# Patient Record
Sex: MALE | Race: WHITE | NOT HISPANIC OR LATINO | Employment: FULL TIME | URBAN - METROPOLITAN AREA
[De-identification: names, ages, dates, MRNs, and addresses within clinical notes are randomized per-mention and may not be internally consistent; named-entity substitution may affect disease eponyms.]

---

## 2021-08-29 ENCOUNTER — OFFICE VISIT (OUTPATIENT)
Dept: URGENT CARE | Facility: CLINIC | Age: 56
End: 2021-08-29
Payer: COMMERCIAL

## 2021-08-29 VITALS
HEART RATE: 84 BPM | RESPIRATION RATE: 16 BRPM | DIASTOLIC BLOOD PRESSURE: 72 MMHG | WEIGHT: 244 LBS | OXYGEN SATURATION: 96 % | TEMPERATURE: 98 F | BODY MASS INDEX: 32.34 KG/M2 | SYSTOLIC BLOOD PRESSURE: 130 MMHG | HEIGHT: 73 IN

## 2021-08-29 DIAGNOSIS — H61.23 BILATERAL HEARING LOSS DUE TO CERUMEN IMPACTION: ICD-10-CM

## 2021-08-29 DIAGNOSIS — H92.02 OTALGIA OF LEFT EAR: ICD-10-CM

## 2021-08-29 DIAGNOSIS — H60.502 ACUTE NONINFECTIVE OTITIS EXTERNA OF LEFT EAR, UNSPECIFIED TYPE: ICD-10-CM

## 2021-08-29 PROCEDURE — 99203 OFFICE O/P NEW LOW 30 MIN: CPT | Performed by: PHYSICIAN ASSISTANT

## 2021-08-29 RX ORDER — CIPROFLOXACIN AND DEXAMETHASONE 3; 1 MG/ML; MG/ML
4 SUSPENSION/ DROPS AURICULAR (OTIC) 2 TIMES DAILY
Qty: 2.8 ML | Refills: 0 | Status: SHIPPED | OUTPATIENT
Start: 2021-08-29 | End: 2021-09-05

## 2021-08-29 ASSESSMENT — ENCOUNTER SYMPTOMS
CHILLS: 0
ABDOMINAL PAIN: 0
COUGH: 0
CONSTIPATION: 0
MYALGIAS: 0
NAUSEA: 0
HEADACHES: 0
EYE PAIN: 0
VOMITING: 0
DIARRHEA: 0
SORE THROAT: 0
FEVER: 0
SHORTNESS OF BREATH: 0

## 2021-08-29 NOTE — PROGRESS NOTES
"Subjective:   Cory Yancey is a 55 y.o. male who presents for Cerumen Impaction (5X WEEKS, CAN NOT HEAR OUT OF RIGHT EAR)      HPI:  55-year-old male presents with bilateral decreased hearing and suspected cerumen impaction.  Had similar issue 10 years ago that required lavage.  No tinnitus or otalgia    Review of Systems   Constitutional: Negative for chills and fever.   HENT: Positive for hearing loss. Negative for congestion, ear discharge, ear pain, sore throat and tinnitus.    Eyes: Negative for pain.   Respiratory: Negative for cough and shortness of breath.    Cardiovascular: Negative for chest pain.   Gastrointestinal: Negative for abdominal pain, constipation, diarrhea, nausea and vomiting.   Genitourinary: Negative for dysuria.   Musculoskeletal: Negative for myalgias.   Skin: Negative for rash.   Neurological: Negative for headaches.       Medications:    • ciprofloxacin/dexamethasone Susp  • oxyCODONE-acetaminophen Tabs    Allergies: Patient has no known allergies.    Problem List: Cory Yancey does not have a problem list on file.    Surgical History:  No past surgical history on file.    Past Social Hx: Cory Yancey       Past Family Hx:  Cory Yancey family history is not on file.     Problem list, medications, and allergies reviewed by myself today in Epic.     Objective:     /72 (BP Location: Left arm, Patient Position: Sitting, BP Cuff Size: Large adult)   Pulse 84   Temp 36.7 °C (98 °F) (Temporal)   Resp 16   Ht 1.854 m (6' 1\")   Wt 111 kg (244 lb)   SpO2 96%   BMI 32.19 kg/m²     Physical Exam  Vitals reviewed.   Constitutional:       Appearance: Normal appearance.   HENT:      Head: Normocephalic and atraumatic.      Right Ear: External ear normal. There is impacted cerumen.      Left Ear: External ear normal. There is impacted cerumen.      Nose: Nose normal.      Mouth/Throat:      Mouth: Mucous membranes are moist.   Eyes:      " Conjunctiva/sclera: Conjunctivae normal.   Cardiovascular:      Rate and Rhythm: Normal rate.   Pulmonary:      Effort: Pulmonary effort is normal.   Skin:     General: Skin is warm and dry.      Capillary Refill: Capillary refill takes less than 2 seconds.   Neurological:      Mental Status: He is alert and oriented to person, place, and time.         Assessment/Plan:     Diagnosis and associated orders:     1. Bilateral hearing loss due to cerumen impaction     2. Otalgia of left ear     3. Acute noninfective otitis externa of left ear, unspecified type  ciprofloxacin/dexamethasone (CIPRODEX) 0.3-0.1 % Suspension      Comments/MDM:     • Peroxide drops were used to soften wax followed by irrigation, the medical assistant attempted to curettage out a large chunk from the left ear however patient noted increase in his pain.  I reevaluated the patient and the canal was found to be erythematous and mildly edematous without any bleeding or perforation visualized.  Will cover with Ciprodex drops, recommend irrigation at home versus returning versus follow-up with primary to complete lavage.  Patient may be able to remedy this at home as well.         Differential diagnosis, natural history, supportive care, and indications for immediate follow-up discussed.    Advised the patient to follow-up with the primary care physician for recheck, reevaluation, and consideration of further management.    Please note that this dictation was created using voice recognition software. I have made a reasonable attempt to correct obvious errors, but I expect that there are errors of grammar and possibly content that I did not discover before finalizing the note.    This note was electronically signed by Gage Montgomery PA-C

## 2022-05-06 ENCOUNTER — OFFICE VISIT (OUTPATIENT)
Dept: URGENT CARE | Facility: CLINIC | Age: 57
End: 2022-05-06
Payer: COMMERCIAL

## 2022-05-06 ENCOUNTER — HOSPITAL ENCOUNTER (OUTPATIENT)
Dept: RADIOLOGY | Facility: MEDICAL CENTER | Age: 57
End: 2022-05-06
Attending: PHYSICIAN ASSISTANT
Payer: COMMERCIAL

## 2022-05-06 VITALS
TEMPERATURE: 97 F | OXYGEN SATURATION: 95 % | WEIGHT: 247 LBS | SYSTOLIC BLOOD PRESSURE: 142 MMHG | HEIGHT: 73 IN | DIASTOLIC BLOOD PRESSURE: 78 MMHG | BODY MASS INDEX: 32.74 KG/M2 | HEART RATE: 80 BPM | RESPIRATION RATE: 17 BRPM

## 2022-05-06 DIAGNOSIS — T14.8XXA INFECTED WOUND: ICD-10-CM

## 2022-05-06 DIAGNOSIS — L08.9 INFECTED WOUND: ICD-10-CM

## 2022-05-06 DIAGNOSIS — M79.605 ACUTE PAIN OF LEFT LOWER EXTREMITY: ICD-10-CM

## 2022-05-06 PROCEDURE — 93971 EXTREMITY STUDY: CPT | Mod: LT

## 2022-05-06 PROCEDURE — 99214 OFFICE O/P EST MOD 30 MIN: CPT | Performed by: PHYSICIAN ASSISTANT

## 2022-05-06 RX ORDER — TESTOSTERONE CYPIONATE 200 MG/ML
40 INJECTION, SOLUTION INTRAMUSCULAR DAILY
COMMUNITY

## 2022-05-06 RX ORDER — SULFAMETHOXAZOLE AND TRIMETHOPRIM 800; 160 MG/1; MG/1
1 TABLET ORAL 2 TIMES DAILY
Qty: 20 TABLET | Refills: 0 | Status: SHIPPED | OUTPATIENT
Start: 2022-05-06 | End: 2022-05-16

## 2022-05-06 ASSESSMENT — ENCOUNTER SYMPTOMS
LEG PAIN: 1
PALPITATIONS: 0
FEVER: 0
CHILLS: 0
SHORTNESS OF BREATH: 0

## 2022-05-06 NOTE — PROGRESS NOTES
Subjective:   Cory Yancey is a 56 y.o. male who presents today with   Chief Complaint   Patient presents with   • Leg Problem     Nodules , possible blood clots within inner leg leg  x 5 months        Leg Pain  This is a new problem. The current episode started more than 1 month ago. The problem occurs constantly. The problem has been unchanged. Pertinent negatives include no chest pain, chills or fever. He has tried nothing for the symptoms. The treatment provided no relief.     Patient states he was initially seen a few months ago for an infection in his left inner thigh.  He was concerned for blood clot at that time but they did not do any imaging.  Patient is requesting imaging at this time.  He did have antibiotics at that time but did not have any imaging done.  He states that he does not do any testosterone injections to the thigh.  Patient states initially there was a piece of skin that he peeled off prior to being seen for infection the last time.  Denies any recent injury or trauma to the area.  He states it was clearing out but then over the last month or so it started draining again.  PMH:  has no past medical history on file.  MEDS:   Current Outpatient Medications:   •  testosterone cypionate (DEPO-TESTOSTERONE) 200 MG/ML Solution injection, Inject 50 mg into the shoulder, thigh, or buttocks one time., Disp: , Rfl:   •  sulfamethoxazole-trimethoprim (BACTRIM DS) 800-160 MG tablet, Take 1 Tablet by mouth 2 times a day for 10 days., Disp: 20 Tablet, Rfl: 0  •  oxycodone-acetaminophen (PERCOCET) 5-325 MG TABS, Take 1-2 Tabs by mouth every four hours as needed. (Patient not taking: No sig reported), Disp: 20 Tab, Rfl: 1  ALLERGIES: No Known Allergies  SURGHX: No past surgical history on file.  SOCHX:  reports that he has never smoked. He has never used smokeless tobacco.  FH: Reviewed with patient, not pertinent to this visit.       Review of Systems   Constitutional: Negative for chills and  "fever.   Respiratory: Negative for shortness of breath.    Cardiovascular: Negative for chest pain and palpitations.   Skin:        Infected wound to left inner thigh.        Objective:   /78 (BP Location: Left arm, Patient Position: Sitting, BP Cuff Size: Adult)   Pulse 80   Temp 36.1 °C (97 °F) (Temporal)   Resp 17   Ht 1.854 m (6' 1\")   Wt 112 kg (247 lb)   SpO2 95%   BMI 32.59 kg/m²   Physical Exam  Vitals and nursing note reviewed.   Constitutional:       General: He is not in acute distress.     Appearance: Normal appearance. He is well-developed. He is not ill-appearing or toxic-appearing.   HENT:      Head: Normocephalic and atraumatic.      Right Ear: Hearing normal.      Left Ear: Hearing normal.   Eyes:      Pupils: Pupils are equal, round, and reactive to light.   Cardiovascular:      Rate and Rhythm: Normal rate and regular rhythm.      Heart sounds: Normal heart sounds.   Pulmonary:      Effort: Pulmonary effort is normal.   Musculoskeletal:      Right lower leg: No edema.      Left lower leg: No edema.      Comments: Normal movement in all 4 extremities   Skin:     General: Skin is warm and dry.             Comments: Open wound site to left inner thigh with mild surrounding erythema active drainage of pus.  No streaking proximally or distally.  Does appear to be palpable cordlike area consistent with varicose veins or thrombosis to the area proximally and distally.   Neurological:      Mental Status: He is alert.      Coordination: Coordination normal.   Psychiatric:         Mood and Affect: Mood normal.       US  FINDINGS:     REAL-TIME GRAY-SCALE IMAGING:  Real-time gray-scale imaging reveals no evidence of focal wall thickening.     In the area of pain is elongated hypoechoic structure in the subcutaneous tissues in the inner proximal thigh without significant vascularity. This probably represents a fluid collection possibly evolving hematoma. Consider follow-up.     COLOR AND DUPLEX " DOPPLER IMAGING:  There is no evidence of luminal thrombus.  There is normal augmentation to flow and respiratory variation.     IMPRESSION:     No evidence of left lower extremity deep venous thrombosis.     Elongated hypoechoic subcutaneous structure without internal vascularity could be a fluid collection/hematoma. Suggest follow-up.    Assessment/Plan:   Assessment    1. Acute pain of left lower extremity  - US-EXTREMITY VENOUS LOWER UNILAT LEFT; Future    2. Infected wound  - sulfamethoxazole-trimethoprim (BACTRIM DS) 800-160 MG tablet; Take 1 Tablet by mouth 2 times a day for 10 days.  Dispense: 20 Tablet; Refill: 0  - Referral to General Surgery    Other orders  - testosterone cypionate (DEPO-TESTOSTERONE) 200 MG/ML Solution injection; Inject 50 mg into the shoulder, thigh, or buttocks one time.    Concern for possible tunneling from the open wound.  No indication for incision and drainage as it is actively draining.  Differential as discussed with patient would include superficial thrombosis versus varicose veins to the area.  Findings on ultrasound are consistent with possible tunneling of abscess versus hematoma collection.  Would recommend follow-up with general surgeon as referral was placed today.  Area was cleaned and new bandage was placed today.  Wound care instructions discussed with patient.  Differential diagnosis, natural history, supportive care, and indications for immediate follow-up discussed.   Patient given instructions and understanding of medications and treatment.    If not improving in 3-5 days, F/U with PCP or return to  if symptoms worsen.    Patient agreeable to plan.  Greater than 30 minutes were spent reviewing patient's chart, examining and obtaining history from patient, and discussing plan of care.     Please note that this dictation was created using voice recognition software. I have made every reasonable attempt to correct obvious errors, but I expect that there are errors  of grammar and possibly content that I did not discover before finalizing the note.    Reilly Martinez PA-C

## 2022-06-30 ENCOUNTER — PRE-ADMISSION TESTING (OUTPATIENT)
Dept: ADMISSIONS | Facility: MEDICAL CENTER | Age: 57
End: 2022-06-30
Attending: SURGERY
Payer: COMMERCIAL

## 2022-06-30 RX ORDER — M-VIT,TX,IRON,MINS/CALC/FOLIC 27MG-0.4MG
1 TABLET ORAL DAILY
Status: ON HOLD | COMMUNITY
End: 2022-09-14

## 2022-07-10 ENCOUNTER — APPOINTMENT (OUTPATIENT)
Dept: RADIOLOGY | Facility: MEDICAL CENTER | Age: 57
End: 2022-07-10
Attending: EMERGENCY MEDICINE
Payer: COMMERCIAL

## 2022-07-10 ENCOUNTER — HOSPITAL ENCOUNTER (EMERGENCY)
Facility: MEDICAL CENTER | Age: 57
End: 2022-07-10
Attending: EMERGENCY MEDICINE
Payer: COMMERCIAL

## 2022-07-10 VITALS
DIASTOLIC BLOOD PRESSURE: 77 MMHG | HEIGHT: 73 IN | SYSTOLIC BLOOD PRESSURE: 128 MMHG | OXYGEN SATURATION: 96 % | RESPIRATION RATE: 18 BRPM | WEIGHT: 245.81 LBS | TEMPERATURE: 97.9 F | BODY MASS INDEX: 32.58 KG/M2 | HEART RATE: 64 BPM

## 2022-07-10 DIAGNOSIS — L02.416 ABSCESS OF LEFT THIGH: ICD-10-CM

## 2022-07-10 PROBLEM — L73.2 HIDRADENITIS: Status: ACTIVE | Noted: 2022-07-10

## 2022-07-10 LAB
ALBUMIN SERPL BCP-MCNC: 4 G/DL (ref 3.2–4.9)
ALBUMIN/GLOB SERPL: 1.3 G/DL
ALP SERPL-CCNC: 91 U/L (ref 30–99)
ALT SERPL-CCNC: 20 U/L (ref 2–50)
ANION GAP SERPL CALC-SCNC: 10 MMOL/L (ref 7–16)
AST SERPL-CCNC: 20 U/L (ref 12–45)
BASOPHILS # BLD AUTO: 0.4 % (ref 0–1.8)
BASOPHILS # BLD: 0.04 K/UL (ref 0–0.12)
BILIRUB SERPL-MCNC: 0.3 MG/DL (ref 0.1–1.5)
BUN SERPL-MCNC: 19 MG/DL (ref 8–22)
CALCIUM SERPL-MCNC: 9.3 MG/DL (ref 8.5–10.5)
CHLORIDE SERPL-SCNC: 104 MMOL/L (ref 96–112)
CO2 SERPL-SCNC: 25 MMOL/L (ref 20–33)
CREAT SERPL-MCNC: 0.98 MG/DL (ref 0.5–1.4)
EOSINOPHIL # BLD AUTO: 0.05 K/UL (ref 0–0.51)
EOSINOPHIL NFR BLD: 0.5 % (ref 0–6.9)
ERYTHROCYTE [DISTWIDTH] IN BLOOD BY AUTOMATED COUNT: 47.7 FL (ref 35.9–50)
GFR SERPLBLD CREATININE-BSD FMLA CKD-EPI: 89 ML/MIN/1.73 M 2
GLOBULIN SER CALC-MCNC: 3.1 G/DL (ref 1.9–3.5)
GLUCOSE SERPL-MCNC: 87 MG/DL (ref 65–99)
HCT VFR BLD AUTO: 49.1 % (ref 42–52)
HGB BLD-MCNC: 16.4 G/DL (ref 14–18)
IMM GRANULOCYTES # BLD AUTO: 0.06 K/UL (ref 0–0.11)
IMM GRANULOCYTES NFR BLD AUTO: 0.5 % (ref 0–0.9)
LACTATE SERPL-SCNC: 1 MMOL/L (ref 0.5–2)
LYMPHOCYTES # BLD AUTO: 1.32 K/UL (ref 1–4.8)
LYMPHOCYTES NFR BLD: 12 % (ref 22–41)
MCH RBC QN AUTO: 32 PG (ref 27–33)
MCHC RBC AUTO-ENTMCNC: 33.4 G/DL (ref 33.7–35.3)
MCV RBC AUTO: 95.9 FL (ref 81.4–97.8)
MONOCYTES # BLD AUTO: 0.82 K/UL (ref 0–0.85)
MONOCYTES NFR BLD AUTO: 7.4 % (ref 0–13.4)
NEUTROPHILS # BLD AUTO: 8.74 K/UL (ref 1.82–7.42)
NEUTROPHILS NFR BLD: 79.2 % (ref 44–72)
NRBC # BLD AUTO: 0 K/UL
NRBC BLD-RTO: 0 /100 WBC
PLATELET # BLD AUTO: 333 K/UL (ref 164–446)
PMV BLD AUTO: 8.3 FL (ref 9–12.9)
POTASSIUM SERPL-SCNC: 4.4 MMOL/L (ref 3.6–5.5)
PROT SERPL-MCNC: 7.1 G/DL (ref 6–8.2)
RBC # BLD AUTO: 5.12 M/UL (ref 4.7–6.1)
SODIUM SERPL-SCNC: 139 MMOL/L (ref 135–145)
WBC # BLD AUTO: 11 K/UL (ref 4.8–10.8)

## 2022-07-10 PROCEDURE — 80053 COMPREHEN METABOLIC PANEL: CPT

## 2022-07-10 PROCEDURE — 36415 COLL VENOUS BLD VENIPUNCTURE: CPT

## 2022-07-10 PROCEDURE — 85025 COMPLETE CBC W/AUTO DIFF WBC: CPT

## 2022-07-10 PROCEDURE — 700102 HCHG RX REV CODE 250 W/ 637 OVERRIDE(OP): Performed by: EMERGENCY MEDICINE

## 2022-07-10 PROCEDURE — 700117 HCHG RX CONTRAST REV CODE 255: Performed by: EMERGENCY MEDICINE

## 2022-07-10 PROCEDURE — 83605 ASSAY OF LACTIC ACID: CPT

## 2022-07-10 PROCEDURE — 73701 CT LOWER EXTREMITY W/DYE: CPT | Mod: LT

## 2022-07-10 PROCEDURE — 99284 EMERGENCY DEPT VISIT MOD MDM: CPT

## 2022-07-10 PROCEDURE — A9270 NON-COVERED ITEM OR SERVICE: HCPCS | Performed by: EMERGENCY MEDICINE

## 2022-07-10 PROCEDURE — 99283 EMERGENCY DEPT VISIT LOW MDM: CPT | Performed by: SURGERY

## 2022-07-10 RX ORDER — CLINDAMYCIN HYDROCHLORIDE 150 MG/1
300 CAPSULE ORAL ONCE
Status: COMPLETED | OUTPATIENT
Start: 2022-07-10 | End: 2022-07-10

## 2022-07-10 RX ORDER — CLINDAMYCIN HYDROCHLORIDE 300 MG/1
300 CAPSULE ORAL 4 TIMES DAILY
Qty: 20 CAPSULE | Refills: 0 | Status: SHIPPED | OUTPATIENT
Start: 2022-07-10 | End: 2022-07-15

## 2022-07-10 RX ORDER — HYDROCODONE BITARTRATE AND ACETAMINOPHEN 5; 325 MG/1; MG/1
1 TABLET ORAL EVERY 4 HOURS PRN
Qty: 15 TABLET | Refills: 0 | Status: ON HOLD | OUTPATIENT
Start: 2022-07-10 | End: 2022-07-12

## 2022-07-10 RX ORDER — HYDROCODONE BITARTRATE AND ACETAMINOPHEN 5; 325 MG/1; MG/1
1 TABLET ORAL ONCE
Status: COMPLETED | OUTPATIENT
Start: 2022-07-10 | End: 2022-07-10

## 2022-07-10 RX ADMIN — CLINDAMYCIN HYDROCHLORIDE 300 MG: 150 CAPSULE ORAL at 17:29

## 2022-07-10 RX ADMIN — HYDROCODONE BITARTRATE AND ACETAMINOPHEN 1 TABLET: 5; 325 TABLET ORAL at 17:30

## 2022-07-10 RX ADMIN — IOHEXOL 75 ML: 350 INJECTION, SOLUTION INTRAVENOUS at 15:24

## 2022-07-10 NOTE — ED TRIAGE NOTES
Amb to triage w/ c/o pain/swelling to L inner thigh going into groin.  Hx of abscess/chronic infection to area, scheduled for surgery 7/12.  Pt states symptoms have worsened and unable to tolerate for the next 2 days.

## 2022-07-10 NOTE — ED PROVIDER NOTES
ED Provider Note    ED Provider Note    Primary care provider: Pcp Unknown  Means of arrival: Walk-in  History obtained from: Patient    CHIEF COMPLAINT  Chief Complaint   Patient presents with   • Abscess   • Groin Pain     Seen at 1:03 PM.   HPI  Cory Yancey is a 56 y.o. male  who presents to the Emergency Department left eye pain, redness, swelling and drainage.  The patient has had difficulty in this region since December of last year.  He is went to the urgent care twice already, had ultrasounds and placed on antibiotics.  He was eventually referred to surgery, he saw Dr. Padilla, he is planned for surgical resection of an abscess in 48 hours.  The patient has noted the pain, swelling, redness has steadily increased, significantly so over the past 48 hours, such that the patient is having difficulty walking at this time.    He is unaware of any fevers or chills.  He denies any chest pain, shortness of breath.  No history of diabetes.    REVIEW OF SYSTEMS  See HPI,   Remainder of ROS negative.     PAST MEDICAL HISTORY   has a past medical history of Arthritis (06/30/2022) and PONV (postoperative nausea and vomiting).    SURGICAL HISTORY   has a past surgical history that includes knee arthroscopy (Right, 1989) and arthroscopy, knee (Left).    SOCIAL HISTORY  Social History     Tobacco Use   • Smoking status: Never Smoker   • Smokeless tobacco: Former User     Types: Chew     Quit date: 2017   • Tobacco comment: transitioned to nicotine patch daily in 2017   Vaping Use   • Vaping Use: Never used   Substance Use Topics   • Alcohol use: Not Currently     Comment: Quit 2019   • Drug use: Never      Social History     Substance and Sexual Activity   Drug Use Never       FAMILY HISTORY  History reviewed. No pertinent family history.    CURRENT MEDICATIONS  Reviewed.  See Encounter Summary.     ALLERGIES  No Known Allergies    PHYSICAL EXAM  VITAL SIGNS: /77   Pulse 64   Temp 36.6 °C (97.9  "°F) (Temporal)   Resp 18   Ht 1.854 m (6' 1\")   Wt 112 kg (245 lb 13 oz)   SpO2 96%   BMI 32.43 kg/m²   Constitutional: Awake, alert in no apparent distress.  HENT: Normocephalic, Bilateral external ears normal. Nose normal.   Eyes: Conjunctiva normal, non-icteric, EOMI.    Thorax & Lungs: Easy unlabored respirations, Clear to ascultation bilaterally.  Cardiovascular: Regular rate, Regular rhythm, No murmurs, rubs or gallops. Bilateral pulses symmetrical.   Abdomen:  Soft, nontender, nondistended, normal active bowel sounds.   :    Skin: Left medial thigh: 5 x 5 cm area of dark violaceous skin with purulence and necrotic blood expressed from 2 separate spontaneous draining openings.  This entire region is tender to palpation, there is a faint halo of redness extending 15 cm outside of the initial area, there is tenderness and fullness noted to the posterior aspect of the upper leg as well.  This area does not extend up into the inguinal crease or down to the knee.    Musculoskeletal:   No cyanosis, clubbing or edema. No leg asymmetry.   Neurologic: Alert, Grossly non-focal.   Psychiatric: Normal affect, Normal mood  Lymphatic:  No cervical LAD      RADIOLOGY  CT-EXTREMITY, LOWER WITH LEFT   Final Result      1.  Extensive skin thickening and subcutaneous tissues inflammation at the medial and posterior LEFT upper thigh with inflammatory phlegmon versus developing abscess posterior medially at the upper thigh.  No definite discrete drainable abscess or soft    tissue gas.   2.  No deep muscular inflammatory changes.            COURSE & MEDICAL DECISION MAKING  Pertinent Labs & Imaging studies reviewed. (See chart for details)    Differential diagnoses include but are not limited to: Appears to be a very large abscess, concerning for sepsis, less likely necrotizing fasciitis    1:03 PM - Medical record reviewed, no recent ER visits, patient is scheduled for hydradenitis removal by Dr. Padilla.    2:30 PM Case " discussed with Dr. Adan who will evaluate the patient.    4 PM Dr. Adan evaluated the patient, he ran the case past Dr. Padilla, they recommend starting clindamycin, have the patient continue to wait it out and go for surgery in 48 hours as scheduled.       decision Making:  This is a pleasant 56 y.o. year old male who presents with months of left thigh pain, steadily worsening with swelling over the past 72 hours.  The patient does have a fairly impressive area of induration, leaking of necrotic and purulent material.  I am not concerned about necrotizing fasciitis at this time, he is not diabetic, he has had symptoms for many months.  He does not have pain out of proportion to examination.  CT is not as impressive as physical examination today.  I discussed the case with surgery as above, the patient was evaluated in the emergency department.  They would prefer to continue the current plan.  The patient is willing to try an outpatient round of antibiotics prior to surgical correction in 48 hours.  I will place him on a short course of pain medications as well.    In prescribing controlled substances to this patient, I certify that I have obtained and reviewed the medical history of Cory Yancey. I have also made a good connie effort to obtain applicable records from other providers who have treated the patient and records did not demonstrate any increased risk of substance abuse that would prevent me from prescribing controlled substances.     I have conducted a physical exam and documented it. I have reviewed Mr. Yancey’s prescription history as maintained by the [place] Prescription Monitoring Program.     I have assessed the patient’s risk for abuse, dependency, and addiction using the validated Opioid Risk Tool available at https://www.mdcalc.com/gtjdni-oswt-kygk-ort-narcotic-abuse.     Given the above, I believe the benefits of controlled substance therapy outweigh the risks. The reasons for prescribing  controlled substances include non-narcotic, oral analgesic alternatives have been inadequate for pain control. Accordingly, I have discussed the risk and benefits, treatment plan, and alternative therapies with the patient.         Discharge Medications:  Discharge Medication List as of 7/10/2022  5:35 PM      START taking these medications    Details   HYDROcodone-acetaminophen (NORCO) 5-325 MG Tab per tablet Take 1 Tablet by mouth every four hours as needed (pain) for up to 3 days., Disp-15 Tablet, R-0, Normal      clindamycin (CLEOCIN) 300 MG Cap Take 1 Capsule by mouth 4 times a day for 5 days., Disp-20 Capsule, R-0, Normal             The patient was discharged home (see d/c instructions) was told to return immediately for any signs or symptoms listed, or any worsening at all.  The patient verbally agreed to the discharge precautions and follow-up plan which is documented in EPIC.        FINAL IMPRESSION  1. Abscess of left thigh

## 2022-07-10 NOTE — CONSULTS
DATE OF CONSULTATION:  7/10/2022     REFERRING PHYSICIAN:   Rayshawn Garcia M.D.     CONSULTING PHYSICIAN:  Dominguez Adan M.D.     REASON FOR CONSULTATION:  I have been asked by  to see the patient in surgical consultation for evaluation of left posterior thigh lesion.    HISTORY OF PRESENT ILLNESS: The patient is a 56 year-old White man who presents to the Emergency Department with a chronic posterior left thigh lesion that has become more painful and indurated over the last two-days. The lesions has been present since last December and episodically becomes more painful, swollen, and drains purulent or bloody fluid. He is scheduled to undergo resection of the lesion this Tuesday. However over the weekend the lesion has become more painful causing him discomfort. He notes an area farther caudad has become indurated making it painful to sit. He denies fevers, chills, spreading erythema, nausea, or vomiting.      PAST MEDICAL HISTORY:  has a past medical history of Arthritis (06/30/2022) and PONV (postoperative nausea and vomiting).    PAST SURGICAL HISTORY:  has a past surgical history that includes knee arthroscopy (Right, 1989) and arthroscopy, knee (Left).    ALLERGIES: No Known Allergies    CURRENT MEDICATIONS:    Home Medications    **Home medications have not yet been reviewed for this encounter**         FAMILY HISTORY: family history is not on file.    SOCIAL HISTORY:  reports that he has never smoked. He quit smokeless tobacco use about 5 years ago.  His smokeless tobacco use included chew. He reports previous alcohol use. He reports that he does not use drugs.    REVIEW OF SYSTEMS: Comprehensive review of systems is negative with the exception of the aforementioned HPI, PMH, and PSH bullets in accordance with CMS guidelines.    PHYSICAL EXAMINATION:    Physical Exam  Constitutional:       General: He is not in acute distress.     Appearance: Normal appearance.   HENT:      Head: Normocephalic and  atraumatic.      Right Ear: External ear normal.      Left Ear: External ear normal.      Nose: Nose normal.      Mouth/Throat:      Mouth: Mucous membranes are moist.      Pharynx: Oropharynx is clear.   Eyes:      General: No scleral icterus.     Extraocular Movements: Extraocular movements intact.      Pupils: Pupils are equal, round, and reactive to light.   Cardiovascular:      Rate and Rhythm: Normal rate and regular rhythm.      Pulses: Normal pulses.   Abdominal:      General: There is no distension.      Palpations: Abdomen is soft.      Tenderness: There is no abdominal tenderness. There is no guarding or rebound.   Musculoskeletal:         General: No tenderness or deformity. Normal range of motion.      Cervical back: Neck supple.   Skin:     General: Skin is warm and dry.      Capillary Refill: Capillary refill takes less than 2 seconds.      Coloration: Skin is not jaundiced.      Comments: Left posterior thigh with area of hyperpigmentation and induration, two sinus tracts draining serosanguinous fluid. See image below.   Neurological:      General: No focal deficit present.      Mental Status: He is alert and oriented to person, place, and time.   Psychiatric:         Mood and Affect: Mood normal.         Behavior: Behavior normal.         Thought Content: Thought content normal.         Judgment: Judgment normal.     Left posterior thigh lesion:          LABORATORY VALUES:   Recent Labs     07/10/22  1325   WBC 11.0*   RBC 5.12   HEMOGLOBIN 16.4   HEMATOCRIT 49.1   MCV 95.9   MCH 32.0   MCHC 33.4*   RDW 47.7   PLATELETCT 333   MPV 8.3*     Recent Labs     07/10/22  1325   SODIUM 139   POTASSIUM 4.4   CHLORIDE 104   CO2 25   GLUCOSE 87   BUN 19   CREATININE 0.98   CALCIUM 9.3     Recent Labs     07/10/22  1325   ASTSGOT 20   ALTSGPT 20   TBILIRUBIN 0.3   ALKPHOSPHAT 91   GLOBULIN 3.1            IMAGING:   CT-EXTREMITY, LOWER WITH LEFT   Final Result      1.  Extensive skin thickening and subcutaneous  tissues inflammation at the medial and posterior LEFT upper thigh with inflammatory phlegmon versus developing abscess posterior medially at the upper thigh.  No definite discrete drainable abscess or soft    tissue gas.   2.  No deep muscular inflammatory changes.          ASSESSMENT AND PLAN:     * Hidradenitis- (present on admission)  Assessment & Plan  Chronic left posterior thigh lesion consistent with hidradenitis with worsening swelling, drainage, and pain.  Scheduled for excision on 7/12 with Dr Padilla.  Mild leukocytosis on labs, no drainable collection on CT scan.  Plan for discharge on oral Clindamycin and analgesics with follow-up for excision on Tuesday.         ____________________________________     Dominguez Adan M.D.    DD: 7/10/2022  4:25 PM    ACS NSQIP Surgical Risk Calculator

## 2022-07-11 NOTE — ASSESSMENT & PLAN NOTE
Chronic left posterior thigh lesion consistent with hidradenitis with worsening swelling, drainage, and pain.  Scheduled for excision on 7/12 with Dr Padilla.  Mild leukocytosis on labs, no drainable collection on CT scan.  Plan for discharge on oral Clindamycin and analgesics with follow-up for excision on Tuesday.

## 2022-07-12 ENCOUNTER — HOSPITAL ENCOUNTER (OUTPATIENT)
Facility: MEDICAL CENTER | Age: 57
End: 2022-07-12
Attending: SURGERY | Admitting: SURGERY
Payer: COMMERCIAL

## 2022-07-12 ENCOUNTER — ANESTHESIA EVENT (OUTPATIENT)
Dept: SURGERY | Facility: MEDICAL CENTER | Age: 57
End: 2022-07-12
Payer: COMMERCIAL

## 2022-07-12 ENCOUNTER — ANESTHESIA (OUTPATIENT)
Dept: SURGERY | Facility: MEDICAL CENTER | Age: 57
End: 2022-07-12
Payer: COMMERCIAL

## 2022-07-12 VITALS
HEART RATE: 73 BPM | HEIGHT: 73 IN | RESPIRATION RATE: 18 BRPM | WEIGHT: 244.71 LBS | OXYGEN SATURATION: 94 % | SYSTOLIC BLOOD PRESSURE: 144 MMHG | BODY MASS INDEX: 32.43 KG/M2 | TEMPERATURE: 98.6 F | DIASTOLIC BLOOD PRESSURE: 78 MMHG

## 2022-07-12 DIAGNOSIS — G89.18 POST-OPERATIVE PAIN: ICD-10-CM

## 2022-07-12 LAB
FUNGUS SPEC FUNGUS STN: NORMAL
GRAM STN SPEC: NORMAL
PATHOLOGY CONSULT NOTE: NORMAL
SIGNIFICANT IND 70042: NORMAL
SIGNIFICANT IND 70042: NORMAL
SITE SITE: NORMAL
SITE SITE: NORMAL
SOURCE SOURCE: NORMAL
SOURCE SOURCE: NORMAL

## 2022-07-12 PROCEDURE — 700101 HCHG RX REV CODE 250: Performed by: ANESTHESIOLOGY

## 2022-07-12 PROCEDURE — 160025 RECOVERY II MINUTES (STATS): Performed by: SURGERY

## 2022-07-12 PROCEDURE — 87070 CULTURE OTHR SPECIMN AEROBIC: CPT

## 2022-07-12 PROCEDURE — 160002 HCHG RECOVERY MINUTES (STAT): Performed by: SURGERY

## 2022-07-12 PROCEDURE — 160046 HCHG PACU - 1ST 60 MINS PHASE II: Performed by: SURGERY

## 2022-07-12 PROCEDURE — 160028 HCHG SURGERY MINUTES - 1ST 30 MINS LEVEL 3: Performed by: SURGERY

## 2022-07-12 PROCEDURE — 700111 HCHG RX REV CODE 636 W/ 250 OVERRIDE (IP): Performed by: ANESTHESIOLOGY

## 2022-07-12 PROCEDURE — 700105 HCHG RX REV CODE 258: Performed by: SURGERY

## 2022-07-12 PROCEDURE — 87185 SC STD ENZYME DETCJ PER NZM: CPT

## 2022-07-12 PROCEDURE — 160039 HCHG SURGERY MINUTES - EA ADDL 1 MIN LEVEL 3: Performed by: SURGERY

## 2022-07-12 PROCEDURE — 00400 ANES INTEGUMENTARY SYS NOS: CPT | Performed by: ANESTHESIOLOGY

## 2022-07-12 PROCEDURE — 160009 HCHG ANES TIME/MIN: Performed by: SURGERY

## 2022-07-12 PROCEDURE — 160047 HCHG PACU  - EA ADDL 30 MINS PHASE II: Performed by: SURGERY

## 2022-07-12 PROCEDURE — 87102 FUNGUS ISOLATION CULTURE: CPT

## 2022-07-12 PROCEDURE — 87075 CULTR BACTERIA EXCEPT BLOOD: CPT

## 2022-07-12 PROCEDURE — 160048 HCHG OR STATISTICAL LEVEL 1-5: Performed by: SURGERY

## 2022-07-12 PROCEDURE — 88304 TISSUE EXAM BY PATHOLOGIST: CPT

## 2022-07-12 PROCEDURE — 160035 HCHG PACU - 1ST 60 MINS PHASE I: Performed by: SURGERY

## 2022-07-12 PROCEDURE — 87205 SMEAR GRAM STAIN: CPT

## 2022-07-12 PROCEDURE — 700101 HCHG RX REV CODE 250: Performed by: SURGERY

## 2022-07-12 PROCEDURE — 700102 HCHG RX REV CODE 250 W/ 637 OVERRIDE(OP): Performed by: ANESTHESIOLOGY

## 2022-07-12 PROCEDURE — A9270 NON-COVERED ITEM OR SERVICE: HCPCS | Performed by: ANESTHESIOLOGY

## 2022-07-12 RX ORDER — OXYCODONE HCL 5 MG/5 ML
5 SOLUTION, ORAL ORAL
Status: COMPLETED | OUTPATIENT
Start: 2022-07-12 | End: 2022-07-12

## 2022-07-12 RX ORDER — HYDROMORPHONE HYDROCHLORIDE 1 MG/ML
0.1 INJECTION, SOLUTION INTRAMUSCULAR; INTRAVENOUS; SUBCUTANEOUS
Status: DISCONTINUED | OUTPATIENT
Start: 2022-07-12 | End: 2022-07-12 | Stop reason: HOSPADM

## 2022-07-12 RX ORDER — ROCURONIUM BROMIDE 10 MG/ML
INJECTION, SOLUTION INTRAVENOUS PRN
Status: DISCONTINUED | OUTPATIENT
Start: 2022-07-12 | End: 2022-07-12 | Stop reason: SURG

## 2022-07-12 RX ORDER — MIDAZOLAM HYDROCHLORIDE 1 MG/ML
INJECTION INTRAMUSCULAR; INTRAVENOUS PRN
Status: DISCONTINUED | OUTPATIENT
Start: 2022-07-12 | End: 2022-07-12 | Stop reason: SURG

## 2022-07-12 RX ORDER — HYDRALAZINE HYDROCHLORIDE 20 MG/ML
5 INJECTION INTRAMUSCULAR; INTRAVENOUS
Status: DISCONTINUED | OUTPATIENT
Start: 2022-07-12 | End: 2022-07-12 | Stop reason: HOSPADM

## 2022-07-12 RX ORDER — DEXAMETHASONE SODIUM PHOSPHATE 4 MG/ML
INJECTION, SOLUTION INTRA-ARTICULAR; INTRALESIONAL; INTRAMUSCULAR; INTRAVENOUS; SOFT TISSUE PRN
Status: DISCONTINUED | OUTPATIENT
Start: 2022-07-12 | End: 2022-07-12 | Stop reason: SURG

## 2022-07-12 RX ORDER — OXYCODONE HCL 5 MG/5 ML
10 SOLUTION, ORAL ORAL
Status: COMPLETED | OUTPATIENT
Start: 2022-07-12 | End: 2022-07-12

## 2022-07-12 RX ORDER — LABETALOL HYDROCHLORIDE 5 MG/ML
5 INJECTION, SOLUTION INTRAVENOUS
Status: DISCONTINUED | OUTPATIENT
Start: 2022-07-12 | End: 2022-07-12 | Stop reason: HOSPADM

## 2022-07-12 RX ORDER — HYDROCODONE BITARTRATE AND ACETAMINOPHEN 5; 325 MG/1; MG/1
1 TABLET ORAL EVERY 4 HOURS PRN
Qty: 30 TABLET | Refills: 0 | Status: SHIPPED | OUTPATIENT
Start: 2022-07-12 | End: 2022-07-19

## 2022-07-12 RX ORDER — ONDANSETRON 2 MG/ML
4 INJECTION INTRAMUSCULAR; INTRAVENOUS
Status: DISCONTINUED | OUTPATIENT
Start: 2022-07-12 | End: 2022-07-12 | Stop reason: HOSPADM

## 2022-07-12 RX ORDER — CEFAZOLIN SODIUM 1 G/3ML
INJECTION, POWDER, FOR SOLUTION INTRAMUSCULAR; INTRAVENOUS PRN
Status: DISCONTINUED | OUTPATIENT
Start: 2022-07-12 | End: 2022-07-12 | Stop reason: SURG

## 2022-07-12 RX ORDER — BUPIVACAINE HYDROCHLORIDE AND EPINEPHRINE 5; 5 MG/ML; UG/ML
INJECTION, SOLUTION EPIDURAL; INTRACAUDAL; PERINEURAL
Status: DISCONTINUED | OUTPATIENT
Start: 2022-07-12 | End: 2022-07-12 | Stop reason: HOSPADM

## 2022-07-12 RX ORDER — KETOROLAC TROMETHAMINE 30 MG/ML
INJECTION, SOLUTION INTRAMUSCULAR; INTRAVENOUS PRN
Status: DISCONTINUED | OUTPATIENT
Start: 2022-07-12 | End: 2022-07-12 | Stop reason: SURG

## 2022-07-12 RX ORDER — HALOPERIDOL 5 MG/ML
1 INJECTION INTRAMUSCULAR
Status: DISCONTINUED | OUTPATIENT
Start: 2022-07-12 | End: 2022-07-12 | Stop reason: HOSPADM

## 2022-07-12 RX ORDER — HYDROMORPHONE HYDROCHLORIDE 1 MG/ML
0.4 INJECTION, SOLUTION INTRAMUSCULAR; INTRAVENOUS; SUBCUTANEOUS
Status: DISCONTINUED | OUTPATIENT
Start: 2022-07-12 | End: 2022-07-12 | Stop reason: HOSPADM

## 2022-07-12 RX ORDER — ALBUTEROL SULFATE 2.5 MG/3ML
2.5 SOLUTION RESPIRATORY (INHALATION)
Status: DISCONTINUED | OUTPATIENT
Start: 2022-07-12 | End: 2022-07-12 | Stop reason: HOSPADM

## 2022-07-12 RX ORDER — ONDANSETRON 2 MG/ML
INJECTION INTRAMUSCULAR; INTRAVENOUS PRN
Status: DISCONTINUED | OUTPATIENT
Start: 2022-07-12 | End: 2022-07-12 | Stop reason: SURG

## 2022-07-12 RX ORDER — SODIUM CHLORIDE, SODIUM LACTATE, POTASSIUM CHLORIDE, CALCIUM CHLORIDE 600; 310; 30; 20 MG/100ML; MG/100ML; MG/100ML; MG/100ML
INJECTION, SOLUTION INTRAVENOUS CONTINUOUS
Status: DISCONTINUED | OUTPATIENT
Start: 2022-07-12 | End: 2022-07-12 | Stop reason: HOSPADM

## 2022-07-12 RX ORDER — DEXMEDETOMIDINE HYDROCHLORIDE 100 UG/ML
INJECTION, SOLUTION INTRAVENOUS PRN
Status: DISCONTINUED | OUTPATIENT
Start: 2022-07-12 | End: 2022-07-12 | Stop reason: SURG

## 2022-07-12 RX ORDER — HYDROMORPHONE HYDROCHLORIDE 1 MG/ML
0.2 INJECTION, SOLUTION INTRAMUSCULAR; INTRAVENOUS; SUBCUTANEOUS
Status: DISCONTINUED | OUTPATIENT
Start: 2022-07-12 | End: 2022-07-12 | Stop reason: HOSPADM

## 2022-07-12 RX ORDER — DIPHENHYDRAMINE HYDROCHLORIDE 50 MG/ML
12.5 INJECTION INTRAMUSCULAR; INTRAVENOUS
Status: DISCONTINUED | OUTPATIENT
Start: 2022-07-12 | End: 2022-07-12 | Stop reason: HOSPADM

## 2022-07-12 RX ORDER — MEPERIDINE HYDROCHLORIDE 25 MG/ML
6.25 INJECTION INTRAMUSCULAR; INTRAVENOUS; SUBCUTANEOUS
Status: DISCONTINUED | OUTPATIENT
Start: 2022-07-12 | End: 2022-07-12 | Stop reason: HOSPADM

## 2022-07-12 RX ADMIN — FENTANYL CITRATE 50 MCG: 50 INJECTION, SOLUTION INTRAMUSCULAR; INTRAVENOUS at 10:41

## 2022-07-12 RX ADMIN — PROPOFOL 200 MG: 10 INJECTION, EMULSION INTRAVENOUS at 09:15

## 2022-07-12 RX ADMIN — ROCURONIUM BROMIDE 50 MG: 10 INJECTION, SOLUTION INTRAVENOUS at 09:15

## 2022-07-12 RX ADMIN — MIDAZOLAM HYDROCHLORIDE 2 MG: 1 INJECTION, SOLUTION INTRAMUSCULAR; INTRAVENOUS at 09:10

## 2022-07-12 RX ADMIN — SUGAMMADEX 200 MG: 100 INJECTION, SOLUTION INTRAVENOUS at 09:40

## 2022-07-12 RX ADMIN — DEXMEDETOMIDINE 25 MCG: 200 INJECTION, SOLUTION INTRAVENOUS at 09:47

## 2022-07-12 RX ADMIN — CEFAZOLIN 2 G: 330 INJECTION, POWDER, FOR SOLUTION INTRAMUSCULAR; INTRAVENOUS at 09:17

## 2022-07-12 RX ADMIN — FENTANYL CITRATE 100 MCG: 50 INJECTION, SOLUTION INTRAMUSCULAR; INTRAVENOUS at 09:46

## 2022-07-12 RX ADMIN — FENTANYL CITRATE 50 MCG: 50 INJECTION, SOLUTION INTRAMUSCULAR; INTRAVENOUS at 09:15

## 2022-07-12 RX ADMIN — OXYCODONE HYDROCHLORIDE 10 MG: 5 SOLUTION ORAL at 10:41

## 2022-07-12 RX ADMIN — SODIUM CHLORIDE, POTASSIUM CHLORIDE, SODIUM LACTATE AND CALCIUM CHLORIDE: 600; 310; 30; 20 INJECTION, SOLUTION INTRAVENOUS at 09:10

## 2022-07-12 RX ADMIN — FENTANYL CITRATE 50 MCG: 50 INJECTION, SOLUTION INTRAMUSCULAR; INTRAVENOUS at 10:15

## 2022-07-12 RX ADMIN — DEXAMETHASONE SODIUM PHOSPHATE 8 MG: 4 INJECTION, SOLUTION INTRA-ARTICULAR; INTRALESIONAL; INTRAMUSCULAR; INTRAVENOUS; SOFT TISSUE at 09:17

## 2022-07-12 RX ADMIN — KETOROLAC TROMETHAMINE 30 MG: 30 INJECTION, SOLUTION INTRAMUSCULAR at 10:15

## 2022-07-12 RX ADMIN — ONDANSETRON 4 MG: 2 INJECTION INTRAMUSCULAR; INTRAVENOUS at 10:01

## 2022-07-12 RX ADMIN — FENTANYL CITRATE 50 MCG: 50 INJECTION, SOLUTION INTRAMUSCULAR; INTRAVENOUS at 09:10

## 2022-07-12 ASSESSMENT — PAIN DESCRIPTION - PAIN TYPE
TYPE: SURGICAL PAIN

## 2022-07-12 ASSESSMENT — PAIN SCALES - GENERAL: PAIN_LEVEL: 2

## 2022-07-12 ASSESSMENT — FIBROSIS 4 INDEX: FIB4 SCORE: 0.78

## 2022-07-12 NOTE — OP REPORT
Surgeon: Richie Padilla MD  Assist: None  Preoperative diagnosis: Infected tissue left inner posterior thigh, likely hidradenitis  Postoperative diagnosis: Infected tissue left inner/posterior thigh, likely hidradenitis  Procedure: Incision, drainage, and debridement of multiple abscesses of the left inner thigh  Anesthesia: General endotracheal anesthesia  Anesthesiologist: Cedrick Tate MD  Indications: 56-year-old man with a chronic soft tissue infection of his left inner and posterior thigh.  This is likely chronic hidradenitis.  Incision and drainage is indicated.  Narrative: The procedure was discussed in detail with the patient including the risks of bleeding, infection, recurrent abscess, and hematoma.  He understood and wished to proceed.  He was placed under anesthesia by Dr. Tate.  He was placed in the prone position.  The infected area of his left inner and posterior thigh was prepped with Betadine prep draped sterilely.  Multiple areas of fluctuance were noted.  Some purulent fluid was noted.  Cultures were obtained.  I performed a total of 4 incision, drainage, and debridement of some chronically infected tissue.  Hemostasis was then assured.  The abscess cavities were then packed with 1 inch packing gauze.  Dressings were placed.  He may ultimately require further debridement and skin grafting.  Patient tolerated the procedure well without apparent complication.  Wound class was class IV.

## 2022-07-12 NOTE — OR NURSING
Pt's VSS; denies N/V; states pain is at tolerable level. Dressing CDI to left upper leg. D/c orders received. IV dc'd. Pt changed into clothing with assistance. Dr. Padilla office called to clarify dressing instructions and follow up appointment. Discharge instructions given; pt and family verbalized understanding and questions answered. Patient states ready to d/c home. No prescriptions given, pt has them at home. Pt dc'd in w/c with RN in stable condition.

## 2022-07-12 NOTE — ANESTHESIA TIME REPORT
Anesthesia Start and Stop Event Times     Date Time Event    7/12/2022 0856 Ready for Procedure     0910 Anesthesia Start     1028 Anesthesia Stop        Responsible Staff  07/12/22    Name Role Begin End    Solis Tate M.D. Anesth 0910 1028        Overtime Reason:  no overtime (within assigned shift)    Comments:

## 2022-07-12 NOTE — OR NURSING
1026 patient arrived from OR, report received, attached to monitoring. VSS, patient oxygenating well on 10 L oxymask, dressing to left leg; iodoform, 4x4 gauze, abd pad, kerlix and covered in ace wrap; clean/dry/intact, ice pack placed on affected area, oral airway in place    1040 oral airway dc    1041 patient co 7/10 pain, medicated per EMAR    1050 telephone updates to wife, Yuliana, questions answered     1117 report called to MALGORZATA Young in phase II     1120 patient transferred to phase II with this RN, VSS, pain tolerable, denies nausea, patient states readiness to return home

## 2022-07-12 NOTE — DISCHARGE INSTRUCTIONS
What to Expect Post Anesthesia    Rest and take it easy for the first 24 hours.  A responsible adult is recommended to remain with you during that time.  It is normal to feel sleepy.  We encourage you to not do anything that requires balance, judgment or coordination.    FOR 24 HOURS DO NOT:  Drive, operate machinery or run household appliances.  Drink beer or alcoholic beverages.  Make important decisions or sign legal documents.    To avoid nausea, slowly advance diet as tolerated, avoiding spicy or greasy foods for the first day.  Add more substantial food to your diet according to your provider's instructions.  Babies can be fed formula or breast milk as soon as they are hungry.  INCREASE FLUIDS AND FIBER TO AVOID CONSTIPATION.    MILD FLU-LIKE SYMPTOMS ARE NORMAL.  YOU MAY EXPERIENCE GENERALIZED MUSCLE ACHES, THROAT IRRITATION, HEADACHE AND/OR SOME NAUSEA.      If any questions arise, call your provider.  If your provider is not available, please feel free to call the Surgical Center at [phone].    MEDICATIONS: Resume taking daily medication.  Take prescribed pain medication with food.  If no medication is prescribed, you may take non-aspirin pain medication if needed.  PAIN MEDICATION CAN BE VERY CONSTIPATING.  Take a stool softener or laxative such as senokot, pericolace, or milk of magnesia if needed.    Last pain medication given at 1040 (10mg oxycodone)      Care of Your Incisions:     Leave the bandages on for 48 hours. Avoid getting lotions, powders or deodorant on the incision while it is healing.  You may have thin paper strips across the incision. These are called Steri-Strips. When they start to curl at the edges, you can peel them off. It is okay to shower with these on.  Don't worry if the area under either incision feels firm or hard. This is normal and usually softens within a few months.    How to Manage Discomfort After Surgery:    It is normal to have tenderness, discomfort or mild  swelling at the site of the incisions.  You may also feel numbness at the incisions.    You will be given a prescription for pain medication prior to being discharged from the hospital. If you are having pain, take the medication as directed by your physician and by the label directions. You should be comfortable and moving around. Most people use some prescription pain medication, though some need only over the counter pain medication, such as ibuprofen (Motrin) or acetaminophen (Tylenol). Some patients have little pain and take nothing at all. Whatever amount of pain you have, it is important to listen to your body and use the medication if needed during your recovery.    Activity:    You may resume your normal routine, but avoid heavy lifting (over 10 pounds), pushing or pulling until your first post-operative visit, unless otherwise specified by your surgeon. Do not drive for at least 24-48 hours after surgery (unless otherwise directed by your surgeon), or while you are taking prescription pain medicine. Prescription pain medicine causes drowsiness (makes you sleepy) so you should avoid doing any tasks where you should be awake and alert (driving, operating machinery, etc).

## 2022-07-12 NOTE — ANESTHESIA POSTPROCEDURE EVALUATION
Patient: oCry Yancey    Procedure Summary     Date: 07/12/22 Room / Location: [place] [address] / SURGERY [facility]    Anesthesia Start: 0910 Anesthesia Stop: 1028    Procedure: EXCISION OF HIDRADENITIS, LEFT INNER THIGH (Left Thigh) Diagnosis: (left thigh infection)    Surgeons: Richie Padilla M.D. Responsible Provider: Solis Tate M.D.    Anesthesia Type: general ASA Status: 1          Final Anesthesia Type: general  Last vitals  BP   Blood Pressure: 124/81    Temp   36.3 °C (97.4 °F)    Pulse   71   Resp   18    SpO2   92 %      Anesthesia Post Evaluation    Patient location during evaluation: PACU  Patient participation: complete - patient participated  Level of consciousness: awake and alert  Pain score: 2    Airway patency: patent  Anesthetic complications: no  Cardiovascular status: hemodynamically stable  Respiratory status: acceptable  Hydration status: euvolemic    PONV: none          No complications documented.     Nurse Pain Score: 2 (NPRS)

## 2022-07-12 NOTE — ANESTHESIA PROCEDURE NOTES
Airway    Date/Time: 7/12/2022 9:16 AM  Performed by: Solis Tate M.D.  Authorized by: Solis Tate M.D.     Location:  OR  Urgency:  Elective  Indications for Airway Management:  Anesthesia      Spontaneous Ventilation: absent    Sedation Level:  Deep  Preoxygenated: Yes    Patient Position:  Sniffing  Mask Difficulty Assessment:  3 - difficult mask (inadequate, unstable or two providers) +/- NMBA  Final Airway Type:  Endotracheal airway  Final Endotracheal Airway:  ETT  Cuffed: Yes    Technique Used for Successful ETT Placement:  Direct laryngoscopy    Insertion Site:  Oral  Blade Type:  Glide  Laryngoscope Blade/Videolaryngoscope Blade Size:  3  ETT Size (mm):  8.0  Measured from:  Teeth  ETT to Teeth (cm):  23  Placement Verified by: auscultation and capnometry    Cormack-Lehane Classification:  Grade IIa - partial view of glottis  Number of Attempts at Approach:  1

## 2022-07-12 NOTE — ANESTHESIA PREPROCEDURE EVALUATION
Case: [id number] Date/Time: 07/12/22 0845    Procedure: EXCISION OF HIDRADENITIS, LEFT INNER THIGH    Pre-op diagnosis: HIDRADENITIS SUPPURATIVA    Location: [facility] OR 10 / SURGERY [facility]    Surgeons: Richie Padilla M.D.          Relevant Problems   ANESTHESIA   (positive) PONV (postoperative nausea and vomiting)      PULMONARY (within normal limits)      NEURO (within normal limits)      CARDIAC (within normal limits)      GI (within normal limits)       (within normal limits)      ENDO (within normal limits)       Physical Exam    Airway   Mallampati: II  TM distance: >3 FB  Neck ROM: full       Cardiovascular - normal exam  Rhythm: regular  Rate: normal  (-) murmur     Dental - normal exam           Pulmonary - normal exam  Breath sounds clear to auscultation     Abdominal    Neurological - normal exam                 Anesthesia Plan    ASA 1       Plan - general       Airway plan will be LMA          Induction: intravenous    Postoperative Plan: Postoperative administration of opioids is intended.    Pertinent diagnostic labs and testing reviewed    Informed Consent:    Anesthetic plan and risks discussed with patient.    Use of blood products discussed with: patient whom consented to blood products.

## 2022-07-15 LAB
BACTERIA WND AEROBE CULT: ABNORMAL
BACTERIA WND AEROBE CULT: ABNORMAL
GRAM STN SPEC: ABNORMAL
SIGNIFICANT IND 70042: ABNORMAL
SITE SITE: ABNORMAL
SOURCE SOURCE: ABNORMAL

## 2022-07-16 LAB
BACTERIA SPEC ANAEROBE CULT: ABNORMAL
BACTERIA SPEC ANAEROBE CULT: ABNORMAL
SIGNIFICANT IND 70042: ABNORMAL
SITE SITE: ABNORMAL
SOURCE SOURCE: ABNORMAL

## 2022-08-03 ENCOUNTER — APPOINTMENT (OUTPATIENT)
Dept: RADIOLOGY | Facility: MEDICAL CENTER | Age: 57
DRG: 863 | End: 2022-08-03
Payer: COMMERCIAL

## 2022-08-03 ENCOUNTER — APPOINTMENT (OUTPATIENT)
Dept: RADIOLOGY | Facility: MEDICAL CENTER | Age: 57
DRG: 863 | End: 2022-08-03
Attending: EMERGENCY MEDICINE
Payer: COMMERCIAL

## 2022-08-03 ENCOUNTER — HOSPITAL ENCOUNTER (INPATIENT)
Facility: MEDICAL CENTER | Age: 57
LOS: 4 days | DRG: 863 | End: 2022-08-07
Attending: EMERGENCY MEDICINE | Admitting: SURGERY
Payer: COMMERCIAL

## 2022-08-03 DIAGNOSIS — G89.18 POST-OPERATIVE PAIN: ICD-10-CM

## 2022-08-03 DIAGNOSIS — L08.9 WOUND INFECTION: ICD-10-CM

## 2022-08-03 DIAGNOSIS — T14.8XXA WOUND INFECTION: ICD-10-CM

## 2022-08-03 PROBLEM — L03.90 CELLULITIS: Status: ACTIVE | Noted: 2022-08-03

## 2022-08-03 LAB
ALBUMIN SERPL BCP-MCNC: 4.2 G/DL (ref 3.2–4.9)
ALBUMIN/GLOB SERPL: 1.3 G/DL
ALP SERPL-CCNC: 89 U/L (ref 30–99)
ALT SERPL-CCNC: 17 U/L (ref 2–50)
ANION GAP SERPL CALC-SCNC: 14 MMOL/L (ref 7–16)
AST SERPL-CCNC: 23 U/L (ref 12–45)
BASOPHILS # BLD AUTO: 0.3 % (ref 0–1.8)
BASOPHILS # BLD: 0.03 K/UL (ref 0–0.12)
BILIRUB SERPL-MCNC: 0.4 MG/DL (ref 0.1–1.5)
BUN SERPL-MCNC: 24 MG/DL (ref 8–22)
CALCIUM SERPL-MCNC: 9.4 MG/DL (ref 8.5–10.5)
CHLORIDE SERPL-SCNC: 103 MMOL/L (ref 96–112)
CO2 SERPL-SCNC: 21 MMOL/L (ref 20–33)
CREAT SERPL-MCNC: 1.04 MG/DL (ref 0.5–1.4)
EOSINOPHIL # BLD AUTO: 0.15 K/UL (ref 0–0.51)
EOSINOPHIL NFR BLD: 1.5 % (ref 0–6.9)
ERYTHROCYTE [DISTWIDTH] IN BLOOD BY AUTOMATED COUNT: 45.6 FL (ref 35.9–50)
GFR SERPLBLD CREATININE-BSD FMLA CKD-EPI: 83 ML/MIN/1.73 M 2
GLOBULIN SER CALC-MCNC: 3.2 G/DL (ref 1.9–3.5)
GLUCOSE SERPL-MCNC: 85 MG/DL (ref 65–99)
HCT VFR BLD AUTO: 47.6 % (ref 42–52)
HGB BLD-MCNC: 16.1 G/DL (ref 14–18)
IMM GRANULOCYTES # BLD AUTO: 0.04 K/UL (ref 0–0.11)
IMM GRANULOCYTES NFR BLD AUTO: 0.4 % (ref 0–0.9)
LACTATE SERPL-SCNC: 0.8 MMOL/L (ref 0.5–2)
LYMPHOCYTES # BLD AUTO: 1.25 K/UL (ref 1–4.8)
LYMPHOCYTES NFR BLD: 12.4 % (ref 22–41)
MCH RBC QN AUTO: 31.5 PG (ref 27–33)
MCHC RBC AUTO-ENTMCNC: 33.8 G/DL (ref 33.7–35.3)
MCV RBC AUTO: 93.2 FL (ref 81.4–97.8)
MONOCYTES # BLD AUTO: 0.94 K/UL (ref 0–0.85)
MONOCYTES NFR BLD AUTO: 9.3 % (ref 0–13.4)
NEUTROPHILS # BLD AUTO: 7.67 K/UL (ref 1.82–7.42)
NEUTROPHILS NFR BLD: 76.1 % (ref 44–72)
NRBC # BLD AUTO: 0 K/UL
NRBC BLD-RTO: 0 /100 WBC
PLATELET # BLD AUTO: 306 K/UL (ref 164–446)
PMV BLD AUTO: 8.8 FL (ref 9–12.9)
POTASSIUM SERPL-SCNC: 4.3 MMOL/L (ref 3.6–5.5)
PROT SERPL-MCNC: 7.4 G/DL (ref 6–8.2)
RBC # BLD AUTO: 5.11 M/UL (ref 4.7–6.1)
SODIUM SERPL-SCNC: 138 MMOL/L (ref 135–145)
WBC # BLD AUTO: 10.1 K/UL (ref 4.8–10.8)

## 2022-08-03 PROCEDURE — 87040 BLOOD CULTURE FOR BACTERIA: CPT | Mod: 91

## 2022-08-03 PROCEDURE — 83605 ASSAY OF LACTIC ACID: CPT

## 2022-08-03 PROCEDURE — 36415 COLL VENOUS BLD VENIPUNCTURE: CPT

## 2022-08-03 PROCEDURE — 302875 HCHG BANDAGE ACE 4 OR 6""

## 2022-08-03 PROCEDURE — A9270 NON-COVERED ITEM OR SERVICE: HCPCS | Performed by: SURGERY

## 2022-08-03 PROCEDURE — 96375 TX/PRO/DX INJ NEW DRUG ADDON: CPT

## 2022-08-03 PROCEDURE — 80053 COMPREHEN METABOLIC PANEL: CPT

## 2022-08-03 PROCEDURE — 304217 HCHG IRRIGATION SYSTEM

## 2022-08-03 PROCEDURE — 700102 HCHG RX REV CODE 250 W/ 637 OVERRIDE(OP): Performed by: SURGERY

## 2022-08-03 PROCEDURE — 700105 HCHG RX REV CODE 258: Performed by: SURGERY

## 2022-08-03 PROCEDURE — 700111 HCHG RX REV CODE 636 W/ 250 OVERRIDE (IP): Performed by: SURGERY

## 2022-08-03 PROCEDURE — 700111 HCHG RX REV CODE 636 W/ 250 OVERRIDE (IP): Performed by: EMERGENCY MEDICINE

## 2022-08-03 PROCEDURE — 85025 COMPLETE CBC W/AUTO DIFF WBC: CPT

## 2022-08-03 PROCEDURE — 96365 THER/PROPH/DIAG IV INF INIT: CPT

## 2022-08-03 PROCEDURE — 770001 HCHG ROOM/CARE - MED/SURG/GYN PRIV*

## 2022-08-03 PROCEDURE — 99285 EMERGENCY DEPT VISIT HI MDM: CPT

## 2022-08-03 RX ORDER — ACETAMINOPHEN 500 MG
1000 TABLET ORAL EVERY 6 HOURS PRN
Status: DISCONTINUED | OUTPATIENT
Start: 2022-08-08 | End: 2022-08-08 | Stop reason: HOSPADM

## 2022-08-03 RX ORDER — ACETAMINOPHEN 500 MG
1000 TABLET ORAL EVERY 6 HOURS
Status: DISCONTINUED | OUTPATIENT
Start: 2022-08-03 | End: 2022-08-08 | Stop reason: HOSPADM

## 2022-08-03 RX ORDER — CLINDAMYCIN HYDROCHLORIDE 300 MG/1
300 CAPSULE ORAL EVERY 6 HOURS
Status: ON HOLD | COMMUNITY
Start: 2022-07-11 | End: 2022-08-07

## 2022-08-03 RX ORDER — OXYCODONE HYDROCHLORIDE 5 MG/1
5 TABLET ORAL
Status: DISCONTINUED | OUTPATIENT
Start: 2022-08-03 | End: 2022-08-08 | Stop reason: HOSPADM

## 2022-08-03 RX ORDER — MORPHINE SULFATE 4 MG/ML
4 INJECTION INTRAVENOUS ONCE
Status: COMPLETED | OUTPATIENT
Start: 2022-08-03 | End: 2022-08-03

## 2022-08-03 RX ORDER — ONDANSETRON 4 MG/1
4 TABLET, ORALLY DISINTEGRATING ORAL EVERY 4 HOURS PRN
Status: DISCONTINUED | OUTPATIENT
Start: 2022-08-03 | End: 2022-08-08 | Stop reason: HOSPADM

## 2022-08-03 RX ORDER — ONDANSETRON 2 MG/ML
4 INJECTION INTRAMUSCULAR; INTRAVENOUS EVERY 4 HOURS PRN
Status: DISCONTINUED | OUTPATIENT
Start: 2022-08-03 | End: 2022-08-08 | Stop reason: HOSPADM

## 2022-08-03 RX ORDER — OXYCODONE HYDROCHLORIDE 10 MG/1
10 TABLET ORAL
Status: DISCONTINUED | OUTPATIENT
Start: 2022-08-03 | End: 2022-08-08 | Stop reason: HOSPADM

## 2022-08-03 RX ORDER — HYDROCODONE BITARTRATE AND ACETAMINOPHEN 5; 325 MG/1; MG/1
1 TABLET ORAL EVERY 4 HOURS PRN
Status: ON HOLD | COMMUNITY
End: 2022-08-07 | Stop reason: SDUPTHER

## 2022-08-03 RX ORDER — CLINDAMYCIN HYDROCHLORIDE 300 MG/1
300 CAPSULE ORAL 4 TIMES DAILY
Status: ON HOLD | COMMUNITY
Start: 2022-07-18 | End: 2022-08-07

## 2022-08-03 RX ORDER — HYDROMORPHONE HYDROCHLORIDE 1 MG/ML
0.5 INJECTION, SOLUTION INTRAMUSCULAR; INTRAVENOUS; SUBCUTANEOUS
Status: DISCONTINUED | OUTPATIENT
Start: 2022-08-03 | End: 2022-08-08 | Stop reason: HOSPADM

## 2022-08-03 RX ADMIN — PIPERACILLIN AND TAZOBACTAM 4.5 G: 4; .5 INJECTION, POWDER, FOR SOLUTION INTRAVENOUS at 19:57

## 2022-08-03 RX ADMIN — OXYCODONE 5 MG: 5 TABLET ORAL at 19:56

## 2022-08-03 RX ADMIN — OXYCODONE HYDROCHLORIDE 10 MG: 10 TABLET ORAL at 23:40

## 2022-08-03 RX ADMIN — ACETAMINOPHEN 1000 MG: 500 TABLET ORAL at 23:41

## 2022-08-03 RX ADMIN — ACETAMINOPHEN 1000 MG: 500 TABLET ORAL at 17:51

## 2022-08-03 RX ADMIN — MORPHINE SULFATE 4 MG: 4 INJECTION INTRAVENOUS at 15:14

## 2022-08-03 RX ADMIN — PIPERACILLIN AND TAZOBACTAM 4.5 G: 4; .5 INJECTION, POWDER, FOR SOLUTION INTRAVENOUS at 17:50

## 2022-08-03 ASSESSMENT — LIFESTYLE VARIABLES
DOES PATIENT WANT TO STOP DRINKING: NO
HAVE PEOPLE ANNOYED YOU BY CRITICIZING YOUR DRINKING: NO
HOW MANY TIMES IN THE PAST YEAR HAVE YOU HAD 5 OR MORE DRINKS IN A DAY: 0
TOTAL SCORE: 0
CONSUMPTION TOTAL: NEGATIVE
HAVE YOU EVER FELT YOU SHOULD CUT DOWN ON YOUR DRINKING: NO
ALCOHOL_USE: NO
TOTAL SCORE: 0
EVER HAD A DRINK FIRST THING IN THE MORNING TO STEADY YOUR NERVES TO GET RID OF A HANGOVER: NO
ON A TYPICAL DAY WHEN YOU DRINK ALCOHOL HOW MANY DRINKS DO YOU HAVE: 0
EVER FELT BAD OR GUILTY ABOUT YOUR DRINKING: NO
TOTAL SCORE: 0
AVERAGE NUMBER OF DAYS PER WEEK YOU HAVE A DRINK CONTAINING ALCOHOL: 0

## 2022-08-03 ASSESSMENT — COGNITIVE AND FUNCTIONAL STATUS - GENERAL
MOBILITY SCORE: 24
SUGGESTED CMS G CODE MODIFIER DAILY ACTIVITY: CH
SUGGESTED CMS G CODE MODIFIER MOBILITY: CH
DAILY ACTIVITIY SCORE: 24

## 2022-08-03 ASSESSMENT — PATIENT HEALTH QUESTIONNAIRE - PHQ9
SUM OF ALL RESPONSES TO PHQ9 QUESTIONS 1 AND 2: 0
2. FEELING DOWN, DEPRESSED, IRRITABLE, OR HOPELESS: NOT AT ALL
1. LITTLE INTEREST OR PLEASURE IN DOING THINGS: NOT AT ALL

## 2022-08-03 ASSESSMENT — FIBROSIS 4 INDEX
FIB4 SCORE: 0.78
FIB4 SCORE: 1.06

## 2022-08-03 ASSESSMENT — PAIN DESCRIPTION - PAIN TYPE: TYPE: ACUTE PAIN

## 2022-08-03 NOTE — ED TRIAGE NOTES
"Chief Complaint   Patient presents with   • Wound Check     Pt had surgery 1 week ago to L groin for abscess. Pt had wound nurse come to house today and was told to come in due to concern for infection       Pt ambulatory to triage for above. Pt states the wound smelling. Pt had a wound vac on at one point, but the wound nurse was unable to remove foam. Per pt, the area of the wound is hot to the touch and red. Pt aox4, GCS 15    /79   Pulse 72   Temp 36.4 °C (97.5 °F) (Temporal)   Resp 18   Ht 1.854 m (6' 1\")   Wt 111 kg (243 lb 13.3 oz)   SpO2 98%   BMI 32.17 kg/m²     "

## 2022-08-03 NOTE — H&P
"  CHIEF COMPLAINT: Increasing pain, tenderness, and foul odor around wound VAC from excision site left medial thigh    HISTORY OF PRESENT ILLNESS: The patient is a 56-year-old man who underwent recent excision of chronic hidradenitis in his left medial thigh.  A wound VAC was placed.  He developed increasing pain, tenderness, redness, and a foul odor over the past few days.  He has not been on antibiotics.  He feels the pain has increased significantly.    PAST MEDICAL HISTORY:  has a past medical history of Arthritis (06/30/2022) and PONV (postoperative nausea and vomiting).    PAST SURGICAL HISTORY:  has a past surgical history that includes knee arthroscopy (Right, 1989); arthroscopy, knee (Left); and mass excision general (Left, 7/12/2022).    ALLERGIES: No Known Allergies    CURRENT MEDICATIONS:    Home Medications     Reviewed by Cate Henson R.N. (Registered Nurse) on 08/03/22 at 1316  Med List Status: Partial   Medication Last Dose Status   testosterone cypionate (DEPO-TESTOSTERONE) 200 MG/ML Solution injection  Active   therapeutic multivitamin-minerals (THERAGRAN-M) Tab  Active                FAMILY HISTORY: family history is not on file.    SOCIAL HISTORY:  reports that he has never smoked. He quit smokeless tobacco use about 5 years ago.  His smokeless tobacco use included chew. He reports previous alcohol use. He reports that he does not use drugs.    REVIEW OF SYSTEMS: Comprehensive review of systems is negative with the exception of the aforementioned HPI, PMH, and PSH bullets in accordance with CMS guidelines.    PHYSICAL EXAMINATION:      Constitutional:     Vital Signs: /79   Pulse 72   Temp 36.4 °C (97.5 °F) (Temporal)   Resp 18   Ht 1.854 m (6' 1\")   Wt 111 kg (243 lb 13.3 oz)   SpO2 98%    General Appearance: appears stated age, is in no apparent distress.  HEENT: The pupils are equal, round, and reactive to light bilaterally. The extraocular muscles are intact bilaterally.. The " sclera are non icteric. Nares and oropharynx are clear.   Neck: Supple. No adenopathy.  Respiratory:   Inspection: Unlabored respirations, no intercostal retractions, paradoxical motion, or accessory muscle use.   Auscultation: normal.  Cardiovascular:   Inspection: The skin is warm.  Auscultation: Regular rate and rhythm.   Peripheral Pulses: Normal.   Abdomen:  Inspection: Abdominal inspection reveals no incisions   Palpation: Palpation is remarkable for no significant tenderness, guarding, or peritoneal findings. No abdominal wall hernias.  Extremities:   On his right medial thigh he has a partially detached wound VAC sponge which is a marked foul odor.  This was removed.  Underneath he has a granulation bed with a small amount of purulence and a significant amount of surrounding cellulitis.  Neurologic:   Alert & oriented to person, time and place. Normal motor function. Normal sensory function. No focal deficits noted.    LABORATORY VALUES:   Recent Labs     08/03/22  1321   WBC 10.1   RBC 5.11   HEMOGLOBIN 16.1   HEMATOCRIT 47.6   MCV 93.2   MCH 31.5   MCHC 33.8   RDW 45.6   PLATELETCT 306   MPV 8.8*     Recent Labs     08/03/22  1321   SODIUM 138   POTASSIUM 4.3   CHLORIDE 103   CO2 21   GLUCOSE 85   BUN 24*   CREATININE 1.04   CALCIUM 9.4     Recent Labs     08/03/22  1321   ASTSGOT 23   ALTSGPT 17   TBILIRUBIN 0.4   ALKPHOSPHAT 89   GLOBULIN 3.2            IMAGING:   CT-EXTREMITY, LOWER WITH LEFT    (Results Pending)       ASSESSMENT AND PLAN:   Soft tissue infection around excision site.  The wound VAC will be removed and I will switch him to wet-to-dry dressing changes.  I will admit him for IV antibiotics, pain control, and training on wound care.  He will require admission at least overnight.     ____________________________________     Richie Padilla M.D.    DD: 8/3/2022  4:06 PM

## 2022-08-03 NOTE — ED PROVIDER NOTES
ED Provider Note    Scribed for Guy Cruz M.D. by Ferny Wise. 8/3/2022,  2:07 PM.    Means of Arrival: Walk in  History obtained from: Patient  History limited by: None    CHIEF COMPLAINT  Chief Complaint   Patient presents with   • Wound Check     Pt had surgery 1 week ago to L groin for abscess. Pt had wound nurse come to house today and was told to come in due to concern for infection       HPI  Cory Yancey is a 56 y.o. male who presents to the Emergency Department for evaluation a moderate post operative complications onset prior to arrival. The patient states that he underwent surgery 1 week ago by Dr. Padilla for a left groin abscess and was discharged with a course of clindamycin. The patient states that he has been compliant with daily wound care, but notes that the wound has become increasing painful. Additionally, the patient reports being evaluated by Dr. Moura (Surgery) who prescribed codeine for pain management. He was prompted to visit the ED today after being visited by his wound care nurse who was concerned for infection. Associated symptoms include diaphoresis, malodorous surgical wound, and groin pain. The patient states that his groin pain has been progressively increasing since last week. He denies any fever, abdominal pain, dysuria, hematuria, or wound drainage. He has been medicating with Codeine with minimal alleviation. His groin pain is exacerbated with movement.     REVIEW OF SYSTEMS  CONSTITUTIONAL:  No fever.  CARDIOVASCULAR:  No chest discomfort.  RESPIRATORY:  No pleuritic chest pain.  GASTROINTESTINAL:  No abdominal pain.  GENITOURINARY:   No dysuria.  MUSCULOSKELETAL:  No arthralgia.  SKIN:  No rash or suspicious lesions.  NEUROLOGIC:   No headache.  See HPI for further details.   All other systems are negative.     PAST MEDICAL HISTORY  Past Medical History:   Diagnosis Date   • Arthritis 06/30/2022    knees   • PONV (postoperative nausea and vomiting)        FAMILY  "HISTORY  No family history noted.    SOCIAL HISTORY   reports that he has never smoked. He quit smokeless tobacco use about 5 years ago.  His smokeless tobacco use included chew. He reports previous alcohol use. He reports that he does not use drugs.    SURGICAL HISTORY  Past Surgical History:   Procedure Laterality Date   • MASS EXCISION GENERAL Left 7/12/2022    Procedure: EXCISION OF HIDRADENITIS, LEFT INNER THIGH;  Surgeon: Richie Padilla M.D.;  Location: SURGERY [facility];  Service: General   • KNEE ARTHROSCOPY Right 1989   • ARTHROSCOPY, KNEE Left     age 15 y/o and 14 y/o       CURRENT MEDICATIONS  Home Medications     Reviewed by Cate Henson R.N. (Registered Nurse) on 08/03/22 at 1316  Med List Status: Partial   Medication Last Dose Status   testosterone cypionate (DEPO-TESTOSTERONE) 200 MG/ML Solution injection  Active   therapeutic multivitamin-minerals (THERAGRAN-M) Tab  Active                ALLERGIES  No Known Allergies    PHYSICAL EXAM  VITAL SIGNS: /79   Pulse 72   Temp 36.4 °C (97.5 °F) (Temporal)   Resp 18   Ht 1.854 m (6' 1\")   Wt 111 kg (243 lb 13.3 oz)   SpO2 98%   BMI 32.17 kg/m²    Gen: Alert, no acute distress  HEENT: ATNC  Eyes: PERRL, EOMI, normal conjunctiva.   Neck: trachea midline  Resp: CTAB, no respiratory distress  CV: Regular rate and rhythm, no murmur, No JVD  Abd: Soft, non-tender, non-distended  : Large wound to left groin with a wound vacuum sponge in place, there is surrounding warmth, erythema, and induration, foul odor, distal CSM is intact, erythema does not cross the inguinal crease, it does not involve the perineum.   Ext: No deformities, no pedal edema  Psych: normal mood  Neuro: speech fluent, moves all extremities.     DIAGNOSTIC STUDIES / PROCEDURES     LABS  Labs Reviewed   CBC WITH DIFFERENTIAL - Abnormal; Notable for the following components:       Result Value    MPV 8.8 (*)     Neutrophils-Polys 76.10 (*)     Lymphocytes 12.40 (*)     " "Neutrophils (Absolute) 7.67 (*)     Monos (Absolute) 0.94 (*)     All other components within normal limits   COMP METABOLIC PANEL - Abnormal; Notable for the following components:    Bun 24 (*)     All other components within normal limits   LACTIC ACID   ESTIMATED GFR   LACTIC ACID   LACTIC ACID   URINALYSIS   URINE CULTURE(NEW)   BLOOD CULTURE    Narrative:     Per Hospital Policy: Only change Specimen Src: to \"Line\" if  specified by physician order.   BLOOD CULTURE    Narrative:     Per Hospital Policy: Only change Specimen Src: to \"Line\" if  specified by physician order.     All labs reviewed by me.      COURSE & MEDICAL DECISION MAKING  Pertinent Labs & Imaging studies reviewed. (See chart for details)    I reviewed the patient's past medical records which revealed the patient underwent surgery on 07/12/2022 by Dr. Padilla for soft tissue infection. He was sent home on a course of clindamycin.     2:07 PM Patient seen and examined at bedside. Ordered for labs and imaging to evaluate. Discussed utilizing labs and imaging to further evaluate the patient, he is amenable to the plan of care.       Medical Decision Making:  Patient resents with foul-smelling wound from a prior surgical debridement from prior infection.  Patient demonstrates no signs of sepsis, however his wound does appear to be infected with surrounding cellulitis.  Case discussed with Dr. Padilla, general surgery, who evaluated the patient at the bedside.  I initially had concern for potential deep space infection, however the patient's labs returned reassuring, and his clinical course does not appear to be consistent with deep space infection or necrotizing fasciitis.  Will defer CAT scan at this point in time.    DISPOSITION:  Patient will be hospitalized by Dr. Padilla in guarded condition.      FINAL IMPRESSION  1. Wound infection            I, Ferny Wise (Scribe), am scribing for, and in the presence of, Guy Cruz M.D..    Electronically " signed by: Ferny Wise (Scribe), 8/3/2022    I, Guy Cruz M.D. personally performed the services described in this documentation, as scribed by Ferny Wise in my presence, and it is both accurate and complete.    The note accurately reflects work and decisions made by me.  Guy Cruz M.D.  8/3/2022  4:13 PM      This dictation was created using voice recognition software. The accuracy of the dictation is limited to the abilities of the software. I expect there may be some errors of grammar and possibly content. The nursing notes were reviewed and certain aspects of this information were incorporated into this note.

## 2022-08-03 NOTE — ED NOTES
Med Rec Complete per patient & patient's preferred pharmacy   Allergies Reviewed with patient  Patient's Preferred Pharmacy:  [facility] on [address].     Patient has been on 2 courses of antibiotic treatments in the last month:      A 5 day course of clindamycin 300mg started on July 11th and a 7 day course of clindamycin 300mg started on July 18th, 2022.  Patient reports that he completed his most recent course of antibiotics.

## 2022-08-04 PROCEDURE — 700111 HCHG RX REV CODE 636 W/ 250 OVERRIDE (IP): Performed by: SURGERY

## 2022-08-04 PROCEDURE — A9270 NON-COVERED ITEM OR SERVICE: HCPCS | Performed by: SURGERY

## 2022-08-04 PROCEDURE — 700101 HCHG RX REV CODE 250: Performed by: SURGERY

## 2022-08-04 PROCEDURE — 770001 HCHG ROOM/CARE - MED/SURG/GYN PRIV*

## 2022-08-04 PROCEDURE — 700105 HCHG RX REV CODE 258: Performed by: SURGERY

## 2022-08-04 PROCEDURE — 97602 WOUND(S) CARE NON-SELECTIVE: CPT

## 2022-08-04 PROCEDURE — 700102 HCHG RX REV CODE 250 W/ 637 OVERRIDE(OP): Performed by: SURGERY

## 2022-08-04 RX ORDER — SODIUM HYPOCHLORITE 1.25 MG/ML
SOLUTION TOPICAL 3 TIMES DAILY
Status: DISCONTINUED | OUTPATIENT
Start: 2022-08-04 | End: 2022-08-04

## 2022-08-04 RX ORDER — SODIUM HYPOCHLORITE 1.25 MG/ML
SOLUTION TOPICAL 2 TIMES DAILY
Status: DISCONTINUED | OUTPATIENT
Start: 2022-08-05 | End: 2022-08-08 | Stop reason: HOSPADM

## 2022-08-04 RX ADMIN — PIPERACILLIN AND TAZOBACTAM 4.5 G: 4; .5 INJECTION, POWDER, FOR SOLUTION INTRAVENOUS at 21:02

## 2022-08-04 RX ADMIN — HYDROMORPHONE HYDROCHLORIDE 0.5 MG: 1 INJECTION, SOLUTION INTRAMUSCULAR; INTRAVENOUS; SUBCUTANEOUS at 22:23

## 2022-08-04 RX ADMIN — PIPERACILLIN AND TAZOBACTAM 4.5 G: 4; .5 INJECTION, POWDER, FOR SOLUTION INTRAVENOUS at 04:26

## 2022-08-04 RX ADMIN — ACETAMINOPHEN 1000 MG: 500 TABLET ORAL at 12:28

## 2022-08-04 RX ADMIN — PIPERACILLIN AND TAZOBACTAM 4.5 G: 4; .5 INJECTION, POWDER, FOR SOLUTION INTRAVENOUS at 12:28

## 2022-08-04 RX ADMIN — SODIUM HYPOCHLORITE 15 ML: 1.25 SOLUTION TOPICAL at 11:54

## 2022-08-04 RX ADMIN — OXYCODONE HYDROCHLORIDE 10 MG: 10 TABLET ORAL at 10:46

## 2022-08-04 RX ADMIN — ACETAMINOPHEN 1000 MG: 500 TABLET ORAL at 16:57

## 2022-08-04 RX ADMIN — OXYCODONE HYDROCHLORIDE 10 MG: 10 TABLET ORAL at 20:02

## 2022-08-04 ASSESSMENT — PAIN DESCRIPTION - PAIN TYPE
TYPE: ACUTE PAIN

## 2022-08-04 NOTE — CARE PLAN
The patient is Stable - Low risk of patient condition declining or worsening    Shift Goals  Clinical Goals: Pain control, wound care  Patient Goals: Plan of care  Family Goals: N/A    Progress made toward(s) clinical / shift goals:    Problem: Knowledge Deficit - Standard  Goal: Patient and family/care givers will demonstrate understanding of plan of care, disease process/condition, diagnostic tests and medications  Outcome: Progressing  Note:   Discussed POC with patient; pain management, wound care, increase mobility, surgery vs IV antibiotics, and discharge planning once medically cleared. Patient verbalized understanding and is agreeable to POC. Encouraging pt to voice questions and concerns. Oriented pt to use of call light. Patient is capable of making needs known.      Problem: Pain - Standard  Goal: Alleviation of pain or a reduction in pain to the patient’s comfort goal  Outcome: Progressing  Note:    Administering pain mediations as ordered (see MAR). Providing patient with cold packs, and repositioning as needed.           Patient is not progressing towards the following goals:

## 2022-08-04 NOTE — PROGRESS NOTES
Report received from MALGORZATA Lutz in ED. Pt brought to room T329 with transport. Skin check complete, oriented to room and call light.

## 2022-08-04 NOTE — WOUND TEAM
[facility]  Inpatient Services  Initial Wound and Skin Care Evaluation    Admission Date: 8/3/2022     Last order of IP CONSULT TO WOUND CARE was found on 8/4/2022 from Hospital Encounter on 8/3/2022     HPI, PMH, SH: Reviewed    Past Surgical History:   Procedure Laterality Date   • MASS EXCISION GENERAL Left 7/12/2022    Procedure: EXCISION OF HIDRADENITIS, LEFT INNER THIGH;  Surgeon: Richie Padilla M.D.;  Location: SURGERY [facility];  Service: General   • KNEE ARTHROSCOPY Right 1989   • ARTHROSCOPY, KNEE Left     age 15 y/o and 14 y/o     Social History     Tobacco Use   • Smoking status: Never Smoker   • Smokeless tobacco: Former User     Types: Chew     Quit date: 2017   • Tobacco comment: transitioned to nicotine patch daily in 2017   Substance Use Topics   • Alcohol use: Not Currently     Comment: Quit 2019     Chief Complaint   Patient presents with   • Wound Check     Pt had surgery 1 week ago to L groin for abscess. Pt had wound nurse come to house today and was told to come in due to concern for infection     Diagnosis: Cellulitis [L03.90]    Unit where seen by Wound Team: [id number]     WOUND CONSULT/FOLLOW UP RELATED TO:  L upper thigh     WOUND HISTORY:  56 y.o. male admitted for increasing pain, and foul odor from L medial thigh excision site. PMH of chronic hidradenitis (patient stated abscess was present for 7 months), and arthritis. Patient had surgery on 7/12/22 for mass excision with Dr. Moura and also had  care for wound vac changes.    WOUND ASSESSMENT/LDA  Wound 08/03/22 Full Thickness Wound Thigh Medial Left (Active)   Wound Image   08/04/22 1200   Site Assessment Painful;Granulation tissue;Slough;Black    Periwound Assessment Edema;Intact    Margins Defined edges;Attached edges    Closure Secondary intention    Drainage Amount Small    Drainage Description Serosanguineous    Treatments Cleansed;Site care    Wound Cleansing Approved Wound Cleanser    Periwound Protectant  Adaptic    Dressing Cleansing/Solutions 1/4 Strength Dakin's Solution    Dressing Options Moist Roll Gauze;Absorbent Abdominal Pad;Mepilex    Dressing Changed Changed    Dressing Status Clean;Dry;Intact    Dressing Change/Treatment Frequency Every Shift, and As Needed    NEXT Dressing Change/Treatment Date 08/05/22    NEXT Weekly Photo (Inpatient Only) 08/11/22    Non-staged Wound Description Full thickness    Wound Length (cm) 11.6 cm    Wound Width (cm) 13.5 cm    Wound Depth (cm) 0.7 cm    Wound Surface Area (cm^2) 156.6 cm^2    Wound Volume (cm^3) 109.62 cm^3    Shape Round    Wound Odor None    Exposed Structures None    WOUND NURSE ONLY - Time Spent with Patient (mins) 45      Vascular:    BERNADETTE:   No results found.    Lab Values:    Lab Results   Component Value Date/Time    WBC 10.1 08/03/2022 01:21 PM    RBC 5.11 08/03/2022 01:21 PM    HEMOGLOBIN 16.1 08/03/2022 01:21 PM    HEMATOCRIT 47.6 08/03/2022 01:21 PM    SEDRATEWES 1 03/03/2005 01:20 PM        Culture Results show:  Recent Results (from the past 720 hour(s))   CULTURE WOUND W/ GRAM STAIN    Collection Time: 07/12/22  9:54 AM    Specimen: Wound   Result Value Ref Range    Significant Indicator POS (POS)     Source WND     Site Left Thigh Infection     Culture Result - (A)     Gram Stain Result Few WBCs.  Rare Gram positive cocci.       Culture Result Streptococcus anginosus  Light growth   (A)        Pain Level/Medicated:  PO 30mins prior, recommend IV premedication for future changes.    INTERVENTIONS BY WOUND TEAM:  Chart and images reviewed. Discussed with bedside RN. All areas of concern (based on picture review, LDA review and discussion with bedside RN) have been thoroughly assessed. Documentation of areas based on significant findings. This RN in to assess patient. Performed standard wound care which includes appropriate positioning, dressing removal and non-selective debridement. Pictures and measurements obtained weekly if/when  required.  Preparation for Dressing removal: Dressing soaked with wound cleanser, patient endorsed pain upon removal  Non-selectively Debrided with:  Cleanser and gauze.  Sharp debridement: NA  Pamela wound: Cleansed with cleanser, Prepped with no sting skin protectant and TREVOR cream (purple top)  Primary Dressing: Adaptic over distal portion of wound, Dakin's moistened roll gauze, and ABD pad  Secondary (Outer) Dressing: Mepilex x3    Interdisciplinary consultation: Patient, Bedside RN ([place]), Dr. Moura    EVALUATION / RATIONALE FOR TREATMENT:  Most Recent Date:  8/4/22: Some stringy slough present, but unable to perform CSWD due to significant pain. No odor noted. Adaptic placed over most painful portion of wound to improve dressing change experience. Dakins applied to chemically debride nonviable tissue and decrease bioburden. MD Moura updated with images.     Goals: Steady decrease in wound area and depth weekly.    WOUND TEAM PLAN OF CARE ([X] for frequency of wound follow up,):   Nursing to follow dressing orders written for wound care. Contact wound team if area fails to progress, deteriorates or with any questions/concerns if something comes up before next scheduled follow up (See below as to whether wound is following and frequency of wound follow up)  Dressing changes by wound team:                   Follow up 3 times weekly:                NPWT change 3 times weekly:     Follow up 1-2 times weekly:    X Weekly  Follow up Bi-Monthly:                   Follow up as needed:     Other (explain):     NURSING PLAN OF CARE ORDERS (X):  Dressing changes: See Dressing Care orders: X  Skin care: See Skin Care orders:   RN Prevention Protocol:   Rectal tube care: See Rectal Tube Care orders:   Other orders:    RSKIN:   CURRENTLY IN PLACE (X), APPLIED THIS VISIT (A), ORDERED (O):   Q shift Jarad:  X  Q shift pressure point assessments:  X    Surface/Positioning   Pressure redistribution mattress          X  Low  Airloss          Bariatric foam      Bariatric WILLIAM     Waffle cushion        Waffle Overlay          Reposition q 2 hours      TAPs Turning system     Z Triston Pillow     Offloading/Redistribution NA  Sacral Mepilex (Silicone dressing)     Heel Mepilex (Silicone dressing)         Heel float boots (Prevalon boot)             Float Heels off Bed with Pillows           Respiratory NA - on RA  Silicone O2 tubing         Gray Foam Ear protectors     Cannula fixation Device (Tender )          High flow offloading Clip    Elastic head band offloading device      Anchorfast                                                         Trach with Optifoam split foam             Containment/Moisture Prevention NA    Rectal tube or BMS    Purwick/Condom Cath        Berger Catheter    Barrier wipes           Barrier paste       Antifungal tx      Interdry        Mobilization Not assessed this visit, mobilized well in bed      Up to chair        Ambulate      PT/OT      Nutrition       Dietician        Diabetes Education      PO   X  TF     TPN     NPO   # days     Anticipated discharge plans:   LTACH:        SNF/Rehab:                  Home Health Care:         X Previously had HH  Outpatient Wound Center:         X Recommended for ongoing wound care needs   Self/Family Care:        Other:                  Vac Discharge Needs:   Not Applicable Pt not on a wound vac:     X  Regular Vac while inpatient, alternative dressing at DC:        Regular Vac in use and continued at DC:            Reg. Vac w/ Skin Sub/Biologic in use. Will need to be changed 2x wkly:      Veraflo Vac while inpatient, ok to transition to Regular Vac on Discharge:          Veraflo Vac while inpatient, will need to remain on Veraflo Vac upon discharge:

## 2022-08-04 NOTE — CARE PLAN
The patient is Stable - Low risk of patient condition declining or worsening    Shift Goals  Clinical Goals: pain control, dressing mangement  Patient Goals: pain control    Progress made toward(s) clinical / shift goals:  pt medicated for pain per MAR    Problem: Knowledge Deficit - Standard  Goal: Patient and family/care givers will demonstrate understanding of plan of care, disease process/condition, diagnostic tests and medications  Outcome: Progressing     Problem: Pain - Standard  Goal: Alleviation of pain or a reduction in pain to the patient’s comfort goal  Outcome: Progressing       Patient is not progressing towards the following goals:

## 2022-08-04 NOTE — PROGRESS NOTES
4 Eyes Skin Assessment Completed by MALGORZATA Esposito and MALGORZATA Finch.    Head WDL  Ears WDL  Nose WDL  Mouth WDL  Neck WDL  Breast/Chest WDL  Shoulder Blades WDL  Spine WDL  (R) Arm/Elbow/Hand WDL  (L) Arm/Elbow/Hand WDL  Abdomen WDL  Groin WDL  Scrotum/Coccyx/Buttocks WDL  (R) Leg WDL  (L) Leg Swelling, DIP to upper thigh wound   (R) Heel/Foot/Toe WDL  (L) Heel/Foot/Toe WDL          Devices In Places Pulse Ox      Interventions In Place Pillows and Pressure Redistribution Mattress    Possible Skin Injury No    Pictures Uploaded Into Epic N/A  Wound Consult Placed Yes  RN Wound Prevention Protocol Ordered No

## 2022-08-05 PROCEDURE — A9270 NON-COVERED ITEM OR SERVICE: HCPCS | Performed by: SURGERY

## 2022-08-05 PROCEDURE — 770001 HCHG ROOM/CARE - MED/SURG/GYN PRIV*

## 2022-08-05 PROCEDURE — 700102 HCHG RX REV CODE 250 W/ 637 OVERRIDE(OP): Performed by: SURGERY

## 2022-08-05 PROCEDURE — 700111 HCHG RX REV CODE 636 W/ 250 OVERRIDE (IP): Performed by: SURGERY

## 2022-08-05 PROCEDURE — 700105 HCHG RX REV CODE 258: Performed by: SURGERY

## 2022-08-05 PROCEDURE — 700101 HCHG RX REV CODE 250: Performed by: SURGERY

## 2022-08-05 RX ADMIN — ACETAMINOPHEN 1000 MG: 500 TABLET ORAL at 04:13

## 2022-08-05 RX ADMIN — HYDROMORPHONE HYDROCHLORIDE 0.5 MG: 1 INJECTION, SOLUTION INTRAMUSCULAR; INTRAVENOUS; SUBCUTANEOUS at 12:40

## 2022-08-05 RX ADMIN — PIPERACILLIN AND TAZOBACTAM 4.5 G: 4; .5 INJECTION, POWDER, FOR SOLUTION INTRAVENOUS at 20:23

## 2022-08-05 RX ADMIN — OXYCODONE HYDROCHLORIDE 10 MG: 10 TABLET ORAL at 09:09

## 2022-08-05 RX ADMIN — ACETAMINOPHEN 1000 MG: 500 TABLET ORAL at 00:03

## 2022-08-05 RX ADMIN — SODIUM HYPOCHLORITE 1 ML: 1.25 SOLUTION TOPICAL at 06:00

## 2022-08-05 RX ADMIN — ACETAMINOPHEN 1000 MG: 500 TABLET ORAL at 17:10

## 2022-08-05 RX ADMIN — HYDROMORPHONE HYDROCHLORIDE 0.5 MG: 1 INJECTION, SOLUTION INTRAMUSCULAR; INTRAVENOUS; SUBCUTANEOUS at 21:31

## 2022-08-05 RX ADMIN — PIPERACILLIN AND TAZOBACTAM 4.5 G: 4; .5 INJECTION, POWDER, FOR SOLUTION INTRAVENOUS at 12:41

## 2022-08-05 RX ADMIN — PIPERACILLIN AND TAZOBACTAM 4.5 G: 4; .5 INJECTION, POWDER, FOR SOLUTION INTRAVENOUS at 04:14

## 2022-08-05 RX ADMIN — SODIUM HYPOCHLORITE 1 ML: 1.25 SOLUTION TOPICAL at 17:10

## 2022-08-05 RX ADMIN — ACETAMINOPHEN 1000 MG: 500 TABLET ORAL at 12:41

## 2022-08-05 RX ADMIN — OXYCODONE HYDROCHLORIDE 10 MG: 10 TABLET ORAL at 04:13

## 2022-08-05 RX ADMIN — OXYCODONE HYDROCHLORIDE 10 MG: 10 TABLET ORAL at 20:23

## 2022-08-05 RX ADMIN — OXYCODONE HYDROCHLORIDE 10 MG: 10 TABLET ORAL at 17:13

## 2022-08-05 ASSESSMENT — COPD QUESTIONNAIRES
DURING THE PAST 4 WEEKS HOW MUCH DID YOU FEEL SHORT OF BREATH: NONE/LITTLE OF THE TIME
COPD SCREENING SCORE: 3
HAVE YOU SMOKED AT LEAST 100 CIGARETTES IN YOUR ENTIRE LIFE: YES
DO YOU EVER COUGH UP ANY MUCUS OR PHLEGM?: NO/ONLY WITH OCCASIONAL COLDS OR INFECTIONS

## 2022-08-05 ASSESSMENT — PAIN DESCRIPTION - PAIN TYPE
TYPE: ACUTE PAIN

## 2022-08-05 NOTE — CARE PLAN
The patient is Stable - Low risk of patient condition declining or worsening    Shift Goals  Clinical Goals: Pain management, wound care, safety  Patient Goals: pain control, POC  Family Goals: LINDSAY    Progress made toward(s) clinical / shift goals:      Problem: Knowledge Deficit - Standard  Goal: Patient and family/care givers will demonstrate understanding of plan of care, disease process/condition, diagnostic tests and medications  Outcome: Progressing   Discussed POC with patient; pain management, wound care, increase mobility, surgery vs IV antibiotics, and discharge planning once medically cleared. Patient verbalized understanding and is agreeable to POC.    Problem: Pain - Standard  Goal: Alleviation of pain or a reduction in pain to the patient’s comfort goal  Outcome: Progressing       Patient is not progressing towards the following goals:

## 2022-08-05 NOTE — PROGRESS NOTES
Surgical Progress Note    Author: Hi Moura M.D. Date & Time created: 2022   10:04 AM     Interval Events:  Significant pain last night with VAC removal. Feels a bit better this am. No fevers    ROS  Hemodynamics:  Temp (24hrs), Av.3 °C (97.4 °F), Min:36.2 °C (97.1 °F), Max:36.6 °C (97.8 °F)  Temperature: 36.4 °C (97.5 °F)  Pulse  Av.2  Min: 62  Max: 92   Blood Pressure: 132/78     Respiratory:    Respiration: 18, Pulse Oximetry: 95 %        RUL Breath Sounds: Clear, RML Breath Sounds: Clear, RLL Breath Sounds: Clear, CODY Breath Sounds: Clear, LLL Breath Sounds: Clear  Neuro:  GCS       Fluids:  No intake or output data in the 24 hours ending 22 1004     Current Diet Order   Procedures   • Diet Order Diet: Regular     Physical Exam   Appears well  Regular heart rate  Normal respiratory effort  Abdomen soft  Left thigh supple, tender to palpate in posterior, no induration, some edema, no spreading cellulitis  Skin pink and warm        Labs:  No results found for this or any previous visit (from the past 24 hour(s)).  Medical Decision Making, by Problem:  Active Hospital Problems    Diagnosis    • Cellulitis [L03.90]      Plan:  55 y/o male with infected left thigh wound, improving now with abx and VAC removal  1. Cont abx  2. Wound care consult  3. Ambulation  4. dvt prophylaxis  5. Plan for possible repeat debridement    Quality Measures:  Quality-Core Measures    Discussed patient condition with RN and Patient

## 2022-08-05 NOTE — PROGRESS NOTES
Surgical Progress Note    Author: Hi Moura M.D. Date & Time created: 2022   10:35 AM     Interval Events:  Recent infected left thigh wound s/p debridement, now readmission with cellulitis and deterioration of wound bed, cellulitis. Pain improved today, now Dakins BID. No fevers.       ROS  Hemodynamics:  Temp (24hrs), Av.3 °C (97.4 °F), Min:36.2 °C (97.1 °F), Max:36.6 °C (97.8 °F)  Temperature: 36.4 °C (97.5 °F)  Pulse  Av.2  Min: 62  Max: 92   Blood Pressure: 132/78     Respiratory:    Respiration: 18, Pulse Oximetry: 95 %        RUL Breath Sounds: Clear, RML Breath Sounds: Clear, RLL Breath Sounds: Clear, CODY Breath Sounds: Clear, LLL Breath Sounds: Clear  Neuro:  GCS       Fluids:  No intake or output data in the 24 hours ending 22 1035     Current Diet Order   Procedures   • Diet Order Diet: Regular     Physical Exam   Appears well and comfortable  Regular heart rate  Normal respiratory effort  Abdomen soft  Left thigh induration improved, periwound erythema resolved  Wound bed with some necrotic slough and slight purulence still, minimal devitalized tissue  Tenderness improved              Labs:  No results found for this or any previous visit (from the past 24 hour(s)).  Medical Decision Making, by Problem:  Active Hospital Problems    Diagnosis    • Cellulitis [L03.90]      Plan:  55 y/o male readmission with cellulitis, now improving but wound not ready yet for discharge  1. Cont abx  2, ambulation  3. Wound care  4. dakins BID, no adaptic  5. Home meds  6. DC planning in 24-48 hours if wound improves    Quality Measures:  Quality-Core Measures    Discussed patient condition with RN and Patient

## 2022-08-05 NOTE — CARE PLAN
The patient is Stable - Low risk of patient condition declining or worsening    Shift Goals  Clinical Goals: Pain control, wound care  Patient Goals: Pain control, POC  Family Goals: N/A    Progress made toward(s) clinical / shift goals:    Problem: Knowledge Deficit - Standard  Goal: Patient and family/care givers will demonstrate understanding of plan of care, disease process/condition, diagnostic tests and medications  Outcome: Progressing  Note:   Discussed POC with patient; pain management, wound care, dressing change, and discharge planning once medically cleared. Patient verbalized understanding and is agreeable to POC. Encouraging pt to voice questions and concerns. Oriented pt to use of call light. Patient is capable of making needs known.      Problem: Pain - Standard  Goal: Alleviation of pain or a reduction in pain to the patient’s comfort goal  Outcome: Progressing  Note:    Administering pain mediations as ordered (see MAR). Providing patient with cold packs, and repositioning as needed.           Patient is not progressing towards the following goals:

## 2022-08-06 PROCEDURE — 700102 HCHG RX REV CODE 250 W/ 637 OVERRIDE(OP): Performed by: SURGERY

## 2022-08-06 PROCEDURE — A9270 NON-COVERED ITEM OR SERVICE: HCPCS | Performed by: SURGERY

## 2022-08-06 PROCEDURE — 700111 HCHG RX REV CODE 636 W/ 250 OVERRIDE (IP): Performed by: SURGERY

## 2022-08-06 PROCEDURE — 770001 HCHG ROOM/CARE - MED/SURG/GYN PRIV*

## 2022-08-06 PROCEDURE — 700105 HCHG RX REV CODE 258: Performed by: SURGERY

## 2022-08-06 RX ADMIN — OXYCODONE HYDROCHLORIDE 10 MG: 10 TABLET ORAL at 21:36

## 2022-08-06 RX ADMIN — OXYCODONE HYDROCHLORIDE 10 MG: 10 TABLET ORAL at 00:34

## 2022-08-06 RX ADMIN — SODIUM HYPOCHLORITE 1 ML: 1.25 SOLUTION TOPICAL at 05:12

## 2022-08-06 RX ADMIN — HYDROMORPHONE HYDROCHLORIDE 0.5 MG: 1 INJECTION, SOLUTION INTRAMUSCULAR; INTRAVENOUS; SUBCUTANEOUS at 12:51

## 2022-08-06 RX ADMIN — ACETAMINOPHEN 1000 MG: 500 TABLET ORAL at 16:14

## 2022-08-06 RX ADMIN — PIPERACILLIN AND TAZOBACTAM 4.5 G: 4; .5 INJECTION, POWDER, FOR SOLUTION INTRAVENOUS at 21:36

## 2022-08-06 RX ADMIN — ACETAMINOPHEN 1000 MG: 500 TABLET ORAL at 05:09

## 2022-08-06 RX ADMIN — PIPERACILLIN AND TAZOBACTAM 4.5 G: 4; .5 INJECTION, POWDER, FOR SOLUTION INTRAVENOUS at 05:10

## 2022-08-06 RX ADMIN — OXYCODONE HYDROCHLORIDE 10 MG: 10 TABLET ORAL at 08:23

## 2022-08-06 RX ADMIN — HYDROMORPHONE HYDROCHLORIDE 0.5 MG: 1 INJECTION, SOLUTION INTRAMUSCULAR; INTRAVENOUS; SUBCUTANEOUS at 22:45

## 2022-08-06 RX ADMIN — SODIUM HYPOCHLORITE 1 ML: 1.25 SOLUTION TOPICAL at 23:50

## 2022-08-06 RX ADMIN — OXYCODONE HYDROCHLORIDE 10 MG: 10 TABLET ORAL at 11:28

## 2022-08-06 RX ADMIN — ACETAMINOPHEN 1000 MG: 500 TABLET ORAL at 00:34

## 2022-08-06 RX ADMIN — SODIUM HYPOCHLORITE 1 ML: 1.25 SOLUTION TOPICAL at 14:55

## 2022-08-06 RX ADMIN — PIPERACILLIN AND TAZOBACTAM 4.5 G: 4; .5 INJECTION, POWDER, FOR SOLUTION INTRAVENOUS at 12:51

## 2022-08-06 ASSESSMENT — PAIN DESCRIPTION - PAIN TYPE
TYPE: ACUTE PAIN

## 2022-08-06 NOTE — CARE PLAN
The patient is Stable - Low risk of patient condition declining or worsening    Shift Goals  Clinical Goals: Pain Control, Antibiotics, Wound Care  Patient Goals: Pain Control, Wound Care, Next level of plan of care  Family Goals: N/A    Progress made toward(s) clinical / shift goals:    Problem: Knowledge Deficit - Standard  Goal: Patient and family/care givers will demonstrate understanding of plan of care, disease process/condition, diagnostic tests and medications  Outcome: Progressing     Problem: Pain - Standard  Goal: Alleviation of pain or a reduction in pain to the patient’s comfort goal  Outcome: Progressing       Patient is not progressing towards the following goals:

## 2022-08-06 NOTE — CARE PLAN
The patient is Stable - Low risk of patient condition declining or worsening    Shift Goals  Clinical Goals: pain control, wound care, IV ABX  Patient Goals: pain control, wound care, IV ABX  Family Goals: no family member    Progress made toward(s) clinical / shift goals:      Patient is not progressing towards the following goals:    Problem: Knowledge Deficit - Standard  Goal: Patient and family/care givers will demonstrate understanding of plan of care, disease process/condition, diagnostic tests and medications  Outcome: Progressing    POC discussed with the patient. Wound dressing q shift as ordered. Questions answered. Verbalized understanding.     Problem: Pain - Standard  Goal: Alleviation of pain or a reduction in pain to the patient’s comfort goal  Outcome: Progressing    Educated on pain scale. Encouraged to verbalize pain. Will medicate as per MAR.

## 2022-08-06 NOTE — PROGRESS NOTES
Surgical Progress Note    Author: Hi Moura M.D. Date & Time created: 2022   10:04 AM     Interval Events:  Significant improved today. Wound bed with granulation tissue throughout, dressing changed tolerated much better. Posterior thigh pain improved.resolved. No fevers    ROS  Hemodynamics:  Temp (24hrs), Av.4 °C (97.5 °F), Min:36.1 °C (96.9 °F), Max:36.9 °C (98.5 °F)  Temperature: 36.4 °C (97.5 °F)  Pulse  Av.3  Min: 56  Max: 92   Blood Pressure: 115/74     Respiratory:    Respiration: 18, Pulse Oximetry: 98 %        RUL Breath Sounds: Clear, RML Breath Sounds: Clear, RLL Breath Sounds: Clear, CODY Breath Sounds: Clear, LLL Breath Sounds: Clear  Neuro:  GCS       Fluids:  No intake or output data in the 24 hours ending 22 1004     Current Diet Order   Procedures   • Diet Order Diet: Regular     Physical Exam   Appears well  Regular heart rate  Normal respiratory effort  Abdomen soft  Left thigh supple, nontender to palpate in posterior, no induration, resolved edema, no cellulitis  Skin pink and warm        Labs:  No results found for this or any previous visit (from the past 24 hour(s)).  Medical Decision Making, by Problem:  Active Hospital Problems    Diagnosis    • Cellulitis [L03.90]      Plan:  57 y/o male with infected left thigh wound, improving now with abx and dakins  1. Cont abx  2. dakins BID  3. Ambulation  4. dvt prophylaxis  5. DC planning tomorrow    Quality Measures:  Quality-Core Measures    Discussed patient condition with RN and Patient

## 2022-08-07 VITALS
SYSTOLIC BLOOD PRESSURE: 135 MMHG | DIASTOLIC BLOOD PRESSURE: 74 MMHG | OXYGEN SATURATION: 98 % | RESPIRATION RATE: 16 BRPM | HEIGHT: 73 IN | HEART RATE: 64 BPM | WEIGHT: 249.56 LBS | BODY MASS INDEX: 33.08 KG/M2 | TEMPERATURE: 97.1 F

## 2022-08-07 PROBLEM — L03.90 CELLULITIS: Status: RESOLVED | Noted: 2022-08-03 | Resolved: 2022-08-07

## 2022-08-07 PROCEDURE — A9270 NON-COVERED ITEM OR SERVICE: HCPCS | Performed by: SURGERY

## 2022-08-07 PROCEDURE — 700111 HCHG RX REV CODE 636 W/ 250 OVERRIDE (IP): Performed by: SURGERY

## 2022-08-07 PROCEDURE — 700102 HCHG RX REV CODE 250 W/ 637 OVERRIDE(OP): Performed by: SURGERY

## 2022-08-07 PROCEDURE — 700105 HCHG RX REV CODE 258: Performed by: SURGERY

## 2022-08-07 RX ORDER — SULFAMETHOXAZOLE AND TRIMETHOPRIM 800; 160 MG/1; MG/1
1 TABLET ORAL 2 TIMES DAILY
Qty: 28 TABLET | Refills: 0 | Status: SHIPPED | OUTPATIENT
Start: 2022-08-07 | End: 2022-08-22

## 2022-08-07 RX ORDER — SODIUM HYPOCHLORITE 1.25 MG/ML
20 SOLUTION TOPICAL DAILY
Qty: 1000 ML | Refills: 1 | Status: ON HOLD | OUTPATIENT
Start: 2022-08-07 | End: 2022-09-14

## 2022-08-07 RX ORDER — AMOXICILLIN AND CLAVULANATE POTASSIUM 875; 125 MG/1; MG/1
1 TABLET, FILM COATED ORAL 2 TIMES DAILY
Qty: 28 TABLET | Refills: 0 | Status: SHIPPED | OUTPATIENT
Start: 2022-08-07 | End: 2022-08-22

## 2022-08-07 RX ORDER — HYDROCODONE BITARTRATE AND ACETAMINOPHEN 5; 325 MG/1; MG/1
1 TABLET ORAL EVERY 4 HOURS PRN
Qty: 42 TABLET | Refills: 0 | Status: SHIPPED | OUTPATIENT
Start: 2022-08-07 | End: 2022-08-15

## 2022-08-07 RX ADMIN — ACETAMINOPHEN 1000 MG: 500 TABLET ORAL at 04:55

## 2022-08-07 RX ADMIN — PIPERACILLIN AND TAZOBACTAM 4.5 G: 4; .5 INJECTION, POWDER, FOR SOLUTION INTRAVENOUS at 04:55

## 2022-08-07 RX ADMIN — OXYCODONE HYDROCHLORIDE 10 MG: 10 TABLET ORAL at 17:14

## 2022-08-07 RX ADMIN — ACETAMINOPHEN 1000 MG: 500 TABLET ORAL at 12:04

## 2022-08-07 RX ADMIN — PIPERACILLIN AND TAZOBACTAM 4.5 G: 4; .5 INJECTION, POWDER, FOR SOLUTION INTRAVENOUS at 12:05

## 2022-08-07 RX ADMIN — ACETAMINOPHEN 1000 MG: 500 TABLET ORAL at 17:14

## 2022-08-07 NOTE — CARE PLAN
The patient is Stable - Low risk of patient condition declining or worsening    Shift Goals  Clinical Goals: Wound Care, Antibiotic Therapy  Patient Goals: Discharge Planning, Wound Care, Antibiotic Therapy  Family Goals: N/A    Progress made toward(s) clinical / shift goals:    Problem: Knowledge Deficit - Standard  Goal: Patient and family/care givers will demonstrate understanding of plan of care, disease process/condition, diagnostic tests and medications  Outcome: Progressing     Problem: Pain - Standard  Goal: Alleviation of pain or a reduction in pain to the patient’s comfort goal  Outcome: Progressing       Patient is not progressing towards the following goals:

## 2022-08-07 NOTE — CARE PLAN
The patient is Stable - Low risk of patient condition declining or worsening    Shift Goals  Clinical Goals: wound dressing change, antibiotics  Patient Goals: discharge today  Family Goals: N/A    Progress made toward(s) clinical / shift goals:    Problem: Knowledge Deficit - Standard  Goal: Patient and family/care givers will demonstrate understanding of plan of care, disease process/condition, diagnostic tests and medications  Outcome: Progressing   POC discussed- pt verbalized understanding.    Problem: Pain - Standard  Goal: Alleviation of pain or a reduction in pain to the patient’s comfort goal  Outcome: Progressing  Pt declines pain meds at this time. Educated pt on importance of pain control- pt verbalized understanding.      Patient is not progressing towards the following goals:

## 2022-08-08 ENCOUNTER — PHARMACY VISIT (OUTPATIENT)
Dept: PHARMACY | Facility: MEDICAL CENTER | Age: 57
End: 2022-08-08
Payer: COMMERCIAL

## 2022-08-08 LAB
BACTERIA BLD CULT: NORMAL
BACTERIA BLD CULT: NORMAL
SIGNIFICANT IND 70042: NORMAL
SIGNIFICANT IND 70042: NORMAL
SITE SITE: NORMAL
SITE SITE: NORMAL
SOURCE SOURCE: NORMAL
SOURCE SOURCE: NORMAL

## 2022-08-08 PROCEDURE — RXMED WILLOW AMBULATORY MEDICATION CHARGE: Performed by: SURGERY

## 2022-08-08 NOTE — DISCHARGE INSTRUCTIONS
Dakins to wound once daily with kerlex, cover with gauze/dry dressing  No bathing/showering, sponge bathe only    Discharge Instructions    Discharged to home by car with relative. Discharged via wheelchair, hospital escort: Refused.  Special equipment needed: Not Applicable    Be sure to schedule a follow-up appointment with your primary care doctor or any specialists as instructed.     Discharge Plan:        I understand that a diet low in cholesterol, fat, and sodium is recommended for good health. Unless I have been given specific instructions below for another diet, I accept this instruction as my diet prescription.   Other diet: Regular    Special Instructions: None    -Is this patient being discharged with medication to prevent blood clots?  No    Is patient discharged on Warfarin / Coumadin?   No

## 2022-08-08 NOTE — DISCHARGE SUMMARY
Discharge Summary    CHIEF COMPLAINT ON ADMISSION  Chief Complaint   Patient presents with   • Wound Check     Pt had surgery 1 week ago to L groin for abscess. Pt had wound nurse come to house today and was told to come in due to concern for infection       Reason for Admission  Wound Check      Admission Date  8/3/2022    CODE STATUS  Full Code    HPI & HOSPITAL COURSE  This is a 56 y.o. male admitted with infected left thigh wound s/p recent debridement and subsequent management with VAC. This was removed upon admission and dakins wet to dry dressings have cleaned the wound bed up significantly. His cellulitis was treated with antibiotics successfully and he will be discharged home on PO antibiotics for completion of full course. The interested reader can refer to the medical chart for additional details.  No notes on file    Therefore, he is discharged in good and stable condition to home with close outpatient follow-up.    The patient met 2-midnight criteria for an inpatient stay at the time of discharge.    Discharge Date  8/7/2022    FOLLOW UP ITEMS POST DISCHARGE  Wound care    DISCHARGE DIAGNOSES  Principal Problem (Resolved):    Cellulitis POA: Yes  Active Problems:    * No active hospital problems. *      FOLLOW UP  No future appointments.  Hi Moura M.D.  [address] [facility]  [place] [address]  [phone]    Schedule an appointment as soon as possible for a visit in 1 week(s)        MEDICATIONS ON DISCHARGE     Medication List      START taking these medications      Instructions   amoxicillin-clavulanate 875-125 MG Tabs  Commonly known as: AUGMENTIN   Take 1 Tablet by mouth 2 times a day for 14 days.  Dose: 1 Tablet     dakins 0.125% (1/4 strength) 0.125 % Soln   Apply 20 mL topically every day for 30 days.  Dose: 20 mL     sulfamethoxazole-trimethoprim 800-160 MG tablet  Commonly known as: BACTRIM DS   Take 1 Tablet by mouth 2 times a day for 14 days.  Dose: 1 Tablet         CONTINUE taking these medications      Instructions   HYDROcodone-acetaminophen 5-325 MG Tabs per tablet  Commonly known as: NORCO   Take 1 Tablet by mouth every four hours as needed (moderate pain) for up to 7 days.  Dose: 1 Tablet     testosterone cypionate 200 MG/ML Soln injection  Commonly known as: DEPO-TESTOSTERONE   Inject  into the shoulder, thigh, or buttocks every day. 200 mcg     therapeutic multivitamin-minerals Tabs   Take 1 Tablet by mouth every day.  Dose: 1 Tablet        STOP taking these medications    clindamycin 300 MG Caps  Commonly known as: CLEOCIN            Allergies  No Known Allergies    DIET  Orders Placed This Encounter   Procedures   • Diet Order Diet: Regular     Standing Status:   Standing     Number of Occurrences:   1     Order Specific Question:   Diet:     Answer:   Regular [1]       ACTIVITY  As tolerated.  Weight bearing as tolerated    CONSULTATIONS  none    PROCEDURES  none    LABORATORY  Lab Results   Component Value Date    SODIUM 138 08/03/2022    POTASSIUM 4.3 08/03/2022    CHLORIDE 103 08/03/2022    CO2 21 08/03/2022    GLUCOSE 85 08/03/2022    BUN 24 (H) 08/03/2022    CREATININE 1.04 08/03/2022        Lab Results   Component Value Date    WBC 10.1 08/03/2022    HEMOGLOBIN 16.1 08/03/2022    HEMATOCRIT 47.6 08/03/2022    PLATELETCT 306 08/03/2022        Total time of the discharge process exceeds 15 minutes.

## 2022-08-08 NOTE — PROGRESS NOTES
Voalte message sent to Dr. Moura regarding POC and potential discharge. MD stated he would be in to discharge pt in a couple hours. Updated pt.

## 2022-08-09 LAB
FUNGUS SPEC CULT: NORMAL
FUNGUS SPEC FUNGUS STN: NORMAL
SIGNIFICANT IND 70042: NORMAL
SITE SITE: NORMAL
SOURCE SOURCE: NORMAL

## 2022-08-17 ENCOUNTER — PHARMACY VISIT (OUTPATIENT)
Dept: PHARMACY | Facility: MEDICAL CENTER | Age: 57
End: 2022-08-17
Payer: COMMERCIAL

## 2022-09-09 ENCOUNTER — PRE-ADMISSION TESTING (OUTPATIENT)
Dept: ADMISSIONS | Facility: MEDICAL CENTER | Age: 57
End: 2022-09-09
Payer: COMMERCIAL

## 2022-09-12 ENCOUNTER — PRE-ADMISSION TESTING (OUTPATIENT)
Dept: ADMISSIONS | Facility: MEDICAL CENTER | Age: 57
End: 2022-09-12
Attending: SURGERY
Payer: COMMERCIAL

## 2022-09-12 RX ORDER — DOXYCYCLINE HYCLATE 100 MG
100 TABLET ORAL 2 TIMES DAILY
COMMUNITY
Start: 2022-09-10

## 2022-09-12 RX ORDER — CEPHALEXIN 500 MG/1
500 CAPSULE ORAL 4 TIMES DAILY
COMMUNITY
Start: 2022-09-01

## 2022-09-14 ENCOUNTER — ANESTHESIA (OUTPATIENT)
Dept: SURGERY | Facility: MEDICAL CENTER | Age: 57
End: 2022-09-14
Payer: COMMERCIAL

## 2022-09-14 ENCOUNTER — HOSPITAL ENCOUNTER (OUTPATIENT)
Facility: MEDICAL CENTER | Age: 57
End: 2022-09-14
Attending: SURGERY | Admitting: SURGERY
Payer: COMMERCIAL

## 2022-09-14 ENCOUNTER — ANESTHESIA EVENT (OUTPATIENT)
Dept: SURGERY | Facility: MEDICAL CENTER | Age: 57
End: 2022-09-14
Payer: COMMERCIAL

## 2022-09-14 VITALS
TEMPERATURE: 96.6 F | RESPIRATION RATE: 16 BRPM | SYSTOLIC BLOOD PRESSURE: 120 MMHG | OXYGEN SATURATION: 95 % | HEIGHT: 73 IN | HEART RATE: 53 BPM | DIASTOLIC BLOOD PRESSURE: 69 MMHG | BODY MASS INDEX: 31.94 KG/M2 | WEIGHT: 240.96 LBS

## 2022-09-14 DIAGNOSIS — G89.18 POST-OPERATIVE PAIN: ICD-10-CM

## 2022-09-14 PROCEDURE — 700105 HCHG RX REV CODE 258: Performed by: SURGERY

## 2022-09-14 PROCEDURE — 160035 HCHG PACU - 1ST 60 MINS PHASE I: Performed by: SURGERY

## 2022-09-14 PROCEDURE — 160036 HCHG PACU - EA ADDL 30 MINS PHASE I: Performed by: SURGERY

## 2022-09-14 PROCEDURE — 160009 HCHG ANES TIME/MIN: Performed by: SURGERY

## 2022-09-14 PROCEDURE — 700102 HCHG RX REV CODE 250 W/ 637 OVERRIDE(OP): Performed by: ANESTHESIOLOGY

## 2022-09-14 PROCEDURE — 160028 HCHG SURGERY MINUTES - 1ST 30 MINS LEVEL 3: Performed by: SURGERY

## 2022-09-14 PROCEDURE — 700101 HCHG RX REV CODE 250: Performed by: SURGERY

## 2022-09-14 PROCEDURE — A9270 NON-COVERED ITEM OR SERVICE: HCPCS | Performed by: ANESTHESIOLOGY

## 2022-09-14 PROCEDURE — 160025 RECOVERY II MINUTES (STATS): Performed by: SURGERY

## 2022-09-14 PROCEDURE — 00400 ANES INTEGUMENTARY SYS NOS: CPT | Performed by: ANESTHESIOLOGY

## 2022-09-14 PROCEDURE — 700101 HCHG RX REV CODE 250: Performed by: ANESTHESIOLOGY

## 2022-09-14 PROCEDURE — 160039 HCHG SURGERY MINUTES - EA ADDL 1 MIN LEVEL 3: Performed by: SURGERY

## 2022-09-14 PROCEDURE — A9270 NON-COVERED ITEM OR SERVICE: HCPCS | Performed by: SURGERY

## 2022-09-14 PROCEDURE — 700102 HCHG RX REV CODE 250 W/ 637 OVERRIDE(OP): Performed by: SURGERY

## 2022-09-14 PROCEDURE — 160046 HCHG PACU - 1ST 60 MINS PHASE II: Performed by: SURGERY

## 2022-09-14 PROCEDURE — 700111 HCHG RX REV CODE 636 W/ 250 OVERRIDE (IP): Performed by: ANESTHESIOLOGY

## 2022-09-14 PROCEDURE — 160002 HCHG RECOVERY MINUTES (STAT): Performed by: SURGERY

## 2022-09-14 PROCEDURE — 160048 HCHG OR STATISTICAL LEVEL 1-5: Performed by: SURGERY

## 2022-09-14 RX ORDER — ONDANSETRON 2 MG/ML
4 INJECTION INTRAMUSCULAR; INTRAVENOUS
Status: DISCONTINUED | OUTPATIENT
Start: 2022-09-14 | End: 2022-09-14 | Stop reason: HOSPADM

## 2022-09-14 RX ORDER — MAGNESIUM CARB/ALUMINUM HYDROX 105-160MG
TABLET,CHEWABLE ORAL
Status: DISCONTINUED | OUTPATIENT
Start: 2022-09-14 | End: 2022-09-14 | Stop reason: HOSPADM

## 2022-09-14 RX ORDER — KETOROLAC TROMETHAMINE 30 MG/ML
INJECTION, SOLUTION INTRAMUSCULAR; INTRAVENOUS PRN
Status: DISCONTINUED | OUTPATIENT
Start: 2022-09-14 | End: 2022-09-14 | Stop reason: SURG

## 2022-09-14 RX ORDER — DEXAMETHASONE SODIUM PHOSPHATE 4 MG/ML
INJECTION, SOLUTION INTRA-ARTICULAR; INTRALESIONAL; INTRAMUSCULAR; INTRAVENOUS; SOFT TISSUE PRN
Status: DISCONTINUED | OUTPATIENT
Start: 2022-09-14 | End: 2022-09-14 | Stop reason: SURG

## 2022-09-14 RX ORDER — SODIUM CHLORIDE, SODIUM LACTATE, POTASSIUM CHLORIDE, CALCIUM CHLORIDE 600; 310; 30; 20 MG/100ML; MG/100ML; MG/100ML; MG/100ML
INJECTION, SOLUTION INTRAVENOUS CONTINUOUS
Status: ACTIVE | OUTPATIENT
Start: 2022-09-14 | End: 2022-09-14

## 2022-09-14 RX ORDER — OXYCODONE HYDROCHLORIDE AND ACETAMINOPHEN 5; 325 MG/1; MG/1
2 TABLET ORAL
Status: COMPLETED | OUTPATIENT
Start: 2022-09-14 | End: 2022-09-14

## 2022-09-14 RX ORDER — METOPROLOL TARTRATE 1 MG/ML
1 INJECTION, SOLUTION INTRAVENOUS
Status: DISCONTINUED | OUTPATIENT
Start: 2022-09-14 | End: 2022-09-14 | Stop reason: HOSPADM

## 2022-09-14 RX ORDER — OXYCODONE HYDROCHLORIDE AND ACETAMINOPHEN 5; 325 MG/1; MG/1
1 TABLET ORAL
Status: COMPLETED | OUTPATIENT
Start: 2022-09-14 | End: 2022-09-14

## 2022-09-14 RX ORDER — HYDROMORPHONE HYDROCHLORIDE 1 MG/ML
0.1 INJECTION, SOLUTION INTRAMUSCULAR; INTRAVENOUS; SUBCUTANEOUS
Status: DISCONTINUED | OUTPATIENT
Start: 2022-09-14 | End: 2022-09-14 | Stop reason: HOSPADM

## 2022-09-14 RX ORDER — LIDOCAINE HYDROCHLORIDE AND EPINEPHRINE 10; 10 MG/ML; UG/ML
INJECTION, SOLUTION INFILTRATION; PERINEURAL
Status: DISCONTINUED | OUTPATIENT
Start: 2022-09-14 | End: 2022-09-14 | Stop reason: HOSPADM

## 2022-09-14 RX ORDER — DEXMEDETOMIDINE HYDROCHLORIDE 100 UG/ML
INJECTION, SOLUTION INTRAVENOUS PRN
Status: DISCONTINUED | OUTPATIENT
Start: 2022-09-14 | End: 2022-09-14 | Stop reason: SURG

## 2022-09-14 RX ORDER — IBUPROFEN 200 MG
800 TABLET ORAL DAILY
COMMUNITY

## 2022-09-14 RX ORDER — SODIUM CHLORIDE, SODIUM LACTATE, POTASSIUM CHLORIDE, CALCIUM CHLORIDE 600; 310; 30; 20 MG/100ML; MG/100ML; MG/100ML; MG/100ML
INJECTION, SOLUTION INTRAVENOUS CONTINUOUS
Status: DISCONTINUED | OUTPATIENT
Start: 2022-09-14 | End: 2022-09-14 | Stop reason: HOSPADM

## 2022-09-14 RX ORDER — LABETALOL HYDROCHLORIDE 5 MG/ML
5 INJECTION, SOLUTION INTRAVENOUS
Status: DISCONTINUED | OUTPATIENT
Start: 2022-09-14 | End: 2022-09-14 | Stop reason: HOSPADM

## 2022-09-14 RX ORDER — MEPERIDINE HYDROCHLORIDE 25 MG/ML
6.25 INJECTION INTRAMUSCULAR; INTRAVENOUS; SUBCUTANEOUS
Status: DISCONTINUED | OUTPATIENT
Start: 2022-09-14 | End: 2022-09-14 | Stop reason: HOSPADM

## 2022-09-14 RX ORDER — LIDOCAINE HYDROCHLORIDE 20 MG/ML
INJECTION, SOLUTION EPIDURAL; INFILTRATION; INTRACAUDAL; PERINEURAL PRN
Status: DISCONTINUED | OUTPATIENT
Start: 2022-09-14 | End: 2022-09-14 | Stop reason: SURG

## 2022-09-14 RX ORDER — HYDROMORPHONE HYDROCHLORIDE 1 MG/ML
0.4 INJECTION, SOLUTION INTRAMUSCULAR; INTRAVENOUS; SUBCUTANEOUS
Status: DISCONTINUED | OUTPATIENT
Start: 2022-09-14 | End: 2022-09-14 | Stop reason: HOSPADM

## 2022-09-14 RX ORDER — HALOPERIDOL 5 MG/ML
1 INJECTION INTRAMUSCULAR
Status: DISCONTINUED | OUTPATIENT
Start: 2022-09-14 | End: 2022-09-14 | Stop reason: HOSPADM

## 2022-09-14 RX ORDER — ACETAMINOPHEN 500 MG
1000 TABLET ORAL DAILY
COMMUNITY

## 2022-09-14 RX ORDER — DIPHENHYDRAMINE HYDROCHLORIDE 50 MG/ML
12.5 INJECTION INTRAMUSCULAR; INTRAVENOUS
Status: DISCONTINUED | OUTPATIENT
Start: 2022-09-14 | End: 2022-09-14 | Stop reason: HOSPADM

## 2022-09-14 RX ORDER — HYDROCODONE BITARTRATE AND ACETAMINOPHEN 5; 325 MG/1; MG/1
1 TABLET ORAL EVERY 4 HOURS PRN
Qty: 42 TABLET | Refills: 0 | Status: SHIPPED | OUTPATIENT
Start: 2022-09-14 | End: 2022-09-21

## 2022-09-14 RX ORDER — IPRATROPIUM BROMIDE AND ALBUTEROL SULFATE 2.5; .5 MG/3ML; MG/3ML
3 SOLUTION RESPIRATORY (INHALATION)
Status: DISCONTINUED | OUTPATIENT
Start: 2022-09-14 | End: 2022-09-14 | Stop reason: HOSPADM

## 2022-09-14 RX ORDER — CEFAZOLIN SODIUM 1 G/3ML
INJECTION, POWDER, FOR SOLUTION INTRAMUSCULAR; INTRAVENOUS PRN
Status: DISCONTINUED | OUTPATIENT
Start: 2022-09-14 | End: 2022-09-14 | Stop reason: SURG

## 2022-09-14 RX ORDER — HYDROMORPHONE HYDROCHLORIDE 1 MG/ML
0.2 INJECTION, SOLUTION INTRAMUSCULAR; INTRAVENOUS; SUBCUTANEOUS
Status: DISCONTINUED | OUTPATIENT
Start: 2022-09-14 | End: 2022-09-14 | Stop reason: HOSPADM

## 2022-09-14 RX ORDER — ONDANSETRON 2 MG/ML
INJECTION INTRAMUSCULAR; INTRAVENOUS PRN
Status: DISCONTINUED | OUTPATIENT
Start: 2022-09-14 | End: 2022-09-14 | Stop reason: SURG

## 2022-09-14 RX ADMIN — DEXMEDETOMIDINE 10 MCG: 200 INJECTION, SOLUTION INTRAVENOUS at 12:26

## 2022-09-14 RX ADMIN — DEXAMETHASONE SODIUM PHOSPHATE 8 MG: 4 INJECTION, SOLUTION INTRA-ARTICULAR; INTRALESIONAL; INTRAMUSCULAR; INTRAVENOUS; SOFT TISSUE at 12:22

## 2022-09-14 RX ADMIN — CEFAZOLIN 2 G: 330 INJECTION, POWDER, FOR SOLUTION INTRAMUSCULAR; INTRAVENOUS at 12:20

## 2022-09-14 RX ADMIN — PROPOFOL 200 MG: 10 INJECTION, EMULSION INTRAVENOUS at 12:14

## 2022-09-14 RX ADMIN — MIDAZOLAM 2 MG: 1 INJECTION INTRAMUSCULAR; INTRAVENOUS at 12:09

## 2022-09-14 RX ADMIN — SODIUM CHLORIDE, POTASSIUM CHLORIDE, SODIUM LACTATE AND CALCIUM CHLORIDE: 600; 310; 30; 20 INJECTION, SOLUTION INTRAVENOUS at 10:44

## 2022-09-14 RX ADMIN — ONDANSETRON 2 MG: 2 INJECTION INTRAMUSCULAR; INTRAVENOUS at 13:11

## 2022-09-14 RX ADMIN — OXYCODONE AND ACETAMINOPHEN 1 TABLET: 5; 325 TABLET ORAL at 14:00

## 2022-09-14 RX ADMIN — KETOROLAC TROMETHAMINE 30 MG: 30 INJECTION, SOLUTION INTRAMUSCULAR at 13:11

## 2022-09-14 RX ADMIN — ROCURONIUM BROMIDE 10 MG: 10 INJECTION, SOLUTION INTRAVENOUS at 12:20

## 2022-09-14 RX ADMIN — ROCURONIUM BROMIDE 10 MG: 10 INJECTION, SOLUTION INTRAVENOUS at 12:38

## 2022-09-14 RX ADMIN — ROCURONIUM BROMIDE 10 MG: 10 INJECTION, SOLUTION INTRAVENOUS at 12:25

## 2022-09-14 RX ADMIN — ONDANSETRON 2 MG: 2 INJECTION INTRAMUSCULAR; INTRAVENOUS at 12:39

## 2022-09-14 RX ADMIN — PROPOFOL 50 MG: 10 INJECTION, EMULSION INTRAVENOUS at 13:11

## 2022-09-14 RX ADMIN — FENTANYL CITRATE 50 MCG: 50 INJECTION, SOLUTION INTRAMUSCULAR; INTRAVENOUS at 12:26

## 2022-09-14 RX ADMIN — FENTANYL CITRATE 50 MCG: 50 INJECTION, SOLUTION INTRAMUSCULAR; INTRAVENOUS at 12:39

## 2022-09-14 RX ADMIN — LIDOCAINE HYDROCHLORIDE 60 MG: 20 INJECTION, SOLUTION EPIDURAL; INFILTRATION; INTRACAUDAL at 12:13

## 2022-09-14 ASSESSMENT — PAIN SCALES - GENERAL: PAIN_LEVEL: 2

## 2022-09-14 ASSESSMENT — PAIN DESCRIPTION - PAIN TYPE
TYPE: SURGICAL PAIN

## 2022-09-14 ASSESSMENT — FIBROSIS 4 INDEX: FIB4 SCORE: 1.06

## 2022-09-14 NOTE — OR NURSING
Patient arrived from [facility]u. Denies pain or NV. States readiness for dc. Presription for norco sent to pharmacy. Wound vac in place and patient to follow up Monday for removal. Dc instructions reviewed. VSS.     1510 Patient discharged. Ambulated to car. Denies pain, nv. Wound vac and supplies given to patient. Ready for dc.

## 2022-09-14 NOTE — OP REPORT
OP Note    PreOp Diagnosis: Chronic left thigh wound      PostOp Diagnosis: Chronic left thigh wound 11 x 12 cm      Procedure(s):  1.  Preparation of wound bed to accept and skin graft, 11 x 12 cm  2.  Split-thickness skin graft (14 µm) 11 x 12 cm  3.  Application of negative pressure wound VAC    Surgeon(s):  Hi Moura M.D.    Anesthesiologist/Type of Anesthesia:  Anesthesiologist: La Keith M.D./* No anesthesia type entered *    Surgical Staff:  Circulator: La Madison R.N.  Relief Circulator: Josefa Smith R.N.  Scrub Person: Manuel Vega    Specimens removed if any:  * No specimens in log *    Estimated Blood Loss: 50 cc    Findings: Excellent wound bed granulation tissue with healthy bleeding    Complications: None observed    Details of procedure:  The patient was met in the preoperative holding area where all of the potential risks and benefits of the procedure were discussed in detail with the patient. All of his questions were answered to his satisfaction and informed consent was signed. The patient was taken back to the operating room and placed supine on the operating room table. General endotracheal anesthesia was induced and all of the patients pressure points were padded appropriately. The patients left thigh was prepped and draped in the usual sterile fashion.  A timeout was performed which correctly identified the patient and the procedure being performed and preoperative antibiotics were given according to SCIP guidelines.     The operation was begun with gentle superficial debridement of the skin graft recipient site until healthy bleeding granulation tissue was present throughout. The dermis of the left thigh was then infiltrated with tumescent solution consisting of 1% lidocaine, 1:100,000 epinephrine and normal saline. The epidermis was appropriately elevated away from the dermis and adequate local vasoconstriction achieved. The dermatome was then passed over the  infiltrated dermis to harvest a 14 micrometer thick skin graft with a 3in guard. This was then placed into warm saline. The donor site was covered with epinephrine soaked telfa for hemostasis. A single pass was made until adequate quantity of skin graft had been harvested. The harvested skin grafts were then meshed at 1:1.5 and gently placed over the recipient wound site. Staples were used to secure the skin grafts to the underlying wound bed and covered with a non-adherent dressing followed by a wound VAC. The donor site was dressed with xeroform, ABD pads and an ace bandage.     The patient tolerated the procedure well and was extubated at the conclusion of the case without incident. All of the sponge, needle and instrument counts were correct at the conclusion of the case. After he was awoken from anesthesia he was taken to the recovery room in stable condition.         9/14/2022 1:19 PM Hi Moura M.D.

## 2022-09-14 NOTE — ANESTHESIA PROCEDURE NOTES
Airway    Date/Time: 9/14/2022 12:16 PM  Performed by: La Keith M.D.  Authorized by: La Keith M.D.     Location:  OR  Urgency:  Elective  Difficult Airway: No    Indications for Airway Management:  Anesthesia      Spontaneous Ventilation: present    Sedation Level:  Deep  Preoxygenated: Yes    Patient Position:  Sniffing  Mask Difficulty Assessment:  0 - not attempted  Final Airway Type:  Supraglottic airway  Final Supraglottic Airway:  Standard LMA    SGA Size:  4  Number of Attempts at Approach:  1  Ventilation Between Attempts:  None  Number of Other Approaches Attempted:  0   Atraumatic

## 2022-09-14 NOTE — ANESTHESIA POSTPROCEDURE EVALUATION
Patient: Cory Yancey    Procedure Summary     Date: 09/14/22 Room / Location: [facility] 08 / SURGERY [facility]    Anesthesia Start: 1208 Anesthesia Stop: 1327    Procedure: SPLIT-THICKNESS SKIN GRAFT LEFT MEDIAL THIGH Diagnosis: (CHRONIC ULCER OF THIGH)    Surgeons: Hi Moura M.D. Responsible Provider: La Keith M.D.    Anesthesia Type: general ASA Status: 2          Final Anesthesia Type: general  Last vitals  BP   Blood Pressure: (!) 141/76    Temp   36.3 °C (97.3 °F)    Pulse   66   Resp   15    SpO2   98 %      Anesthesia Post Evaluation    Patient location during evaluation: PACU  Patient participation: complete - patient participated  Level of consciousness: awake  Pain score: 2    Anesthetic complications: no  Cardiovascular status: adequate  Respiratory status: acceptable  Hydration status: acceptable    PONV: none          No notable events documented.     Nurse Pain Score: 0 (NPRS)

## 2022-09-14 NOTE — ANESTHESIA PREPROCEDURE EVALUATION
Case: [id number] Date/Time: 09/14/22 1145    Procedure: SPLIT-THICKNESS SKIN GRAFT LEFT MEDIAL THIGH    Pre-op diagnosis: CHRONIC ULCER OF THIGH    Location: [facility] OR 08 / SURGERY [facility]    Surgeons: Hi Moura M.D.          Relevant Problems   ANESTHESIA   (positive) PONV (postoperative nausea and vomiting)       Physical Exam    Airway   Mallampati: II  TM distance: >3 FB  Neck ROM: full       Cardiovascular   Rhythm: regular     Dental    Pulmonary   Breath sounds clear to auscultation     Abdominal   Abdomen: soft     Neurological              Anesthesia Plan    ASA 2       Plan - general               Induction: intravenous          Informed Consent:    Anesthetic plan and risks discussed with patient.

## 2022-09-14 NOTE — OR NURSING
1325: Pt arrived from OR, handoff received from anesthesiologist and RN. Patient arouses to voice, moving all extremities, following commands. Patient denies pain and nausea at this time. On 10L via mask, breathing even and unlabored. VSS. Dressing to left thigh w/abdominal pad and ace wrap to posterior thigh and wound vac to inner thigh, C/D/I. Wound vac patent with pressure of 125 mmHg. Doctor     1335: Zeny at bedside speaking with patient.     1345: Patient more awake, oriented x4. Tolerating oral intake of water. States he is comfortable, no pain or nausea.    1400: Patient medicated for pain per MAR. Wife updated on status.     1430: Phase 1 completed. Surgical dressing remains, C/D/I.     1440: Report given to phase 2 Sofie MCGARRY.

## 2022-09-14 NOTE — DISCHARGE INSTRUCTIONS
HOME CARE INSTRUCTIONS    ACTIVITY: Rest and take it easy for the first 24 hours.  A responsible adult is recommended to remain with you during that time.  It is normal to feel sleepy.  We encourage you to not do anything that requires balance, judgment or coordination.    FOR 24 HOURS DO NOT:  Drive, operate machinery or run household appliances.  Drink beer or alcoholic beverages.  Make important decisions or sign legal documents.    SPECIAL INSTRUCTIONS:   DO NOT CHANGE WOUND VAC SPONGE. DO NOT MANIPULATE. If suction is lost, reinforce or call office for guidance  Limited activity/mobility  Dr. Moura is only person to remove Wound VAC sponge    DIET: To avoid nausea, slowly advance diet as tolerated, avoiding spicy or greasy foods for the first day.  Add more substantial food to your diet according to your physician's instructions. INCREASE FLUIDS AND FIBER TO AVOID CONSTIPATION.    MEDICATIONS: Resume taking daily medication.  Take prescribed pain medication with food.  If no medication is prescribed, you may take non-aspirin pain medication if needed.  PAIN MEDICATION CAN BE VERY CONSTIPATING.  Take a stool softener or laxative such as senokot, pericolace, or milk of magnesia if needed.    Prescription given for Norco.  Last pain medication given at 2:00 pm. Next dose available at 6pm.    A follow-up appointment should be arranged with your doctor; call to schedule.    You should CALL YOUR PHYSICIAN if you develop:  Fever greater than 101 degrees F.  Pain not relieved by medication, or persistent nausea or vomiting.  Excessive bleeding (blood soaking through dressing) or unexpected drainage from the wound.  Extreme redness or swelling around the incision site, drainage of pus or foul smelling drainage.  Inability to urinate or empty your bladder within 8 hours.  Problems with breathing or chest pain.    You should call 911 if you develop problems with breathing or chest pain.  If you are unable to contact  your doctor or surgical center, you should go to the nearest emergency room or urgent care center.  Physician's telephone #:  [phone]    MILD FLU-LIKE SYMPTOMS ARE NORMAL.  YOU MAY EXPERIENCE GENERALIZED MUSCLE ACHES, THROAT IRRITATION, HEADACHE AND/OR SOME NAUSEA.    If any questions arise, call your doctor.  If your doctor is not available, please feel free to call the Surgical Center at [phone].  The Center is open Monday through Friday from 7AM to 7PM.      A registered nurse may call you a few days after your surgery to see how you are doing after your procedure.    You may also receive a survey in the mail within the next two weeks and we ask that you take a few moments to complete the survey and return it to us.  Our goal is to provide you with very good care and we value your comments.     Depression / Suicide Risk    As you are discharged from this [facility] facility, it is important to learn how to keep safe from harming yourself.    Recognize the warning signs:  Abrupt changes in personality, positive or negative- including increase in energy   Giving away possessions  Change in eating patterns- significant weight changes-  positive or negative  Change in sleeping patterns- unable to sleep or sleeping all the time   Unwillingness or inability to communicate  Depression  Unusual sadness, discouragement and loneliness  Talk of wanting to die  Neglect of personal appearance   Rebelliousness- reckless behavior  Withdrawal from people/activities they love  Confusion- inability to concentrate     If you or a loved one observes any of these behaviors or has concerns about self-harm, here's what you can do:  Talk about it- your feelings and reasons for harming yourself  Remove any means that you might use to hurt yourself (examples: pills, rope, extension cords, firearm)  Get professional help from the community (Mental Health, Substance Abuse, psychological counseling)  Do not be  alone:Call your Safe Contact- someone whom you trust who will be there for you.  Call your local CRISIS HOTLINE 549-7157 or [phone]  Call your local Children's Mobile Crisis Response Team [place] (176) 654-3494 or www.[employer]  Call the toll free National Suicide Prevention Hotlines   National Suicide Prevention Lifeline 881-078-HAWZ (2850)  [facility] 800-HTQDZTE (843-0524)    I acknowledge receipt and understanding of these Home Care instructions.

## 2024-03-01 ENCOUNTER — HOSPITAL ENCOUNTER (OUTPATIENT)
Dept: LAB | Facility: MEDICAL CENTER | Age: 59
End: 2024-03-01
Attending: FAMILY MEDICINE
Payer: COMMERCIAL

## 2024-03-01 LAB
25(OH)D3 SERPL-MCNC: 97 NG/ML (ref 30–100)
ALBUMIN SERPL BCP-MCNC: 3.9 G/DL (ref 3.2–4.9)
ALBUMIN/GLOB SERPL: 1.6 G/DL
ALP SERPL-CCNC: 70 U/L (ref 30–99)
ALT SERPL-CCNC: 21 U/L (ref 2–50)
ANION GAP SERPL CALC-SCNC: 10 MMOL/L (ref 7–16)
AST SERPL-CCNC: 23 U/L (ref 12–45)
BASOPHILS # BLD AUTO: 0.3 % (ref 0–1.8)
BASOPHILS # BLD: 0.02 K/UL (ref 0–0.12)
BILIRUB SERPL-MCNC: 0.5 MG/DL (ref 0.1–1.5)
BUN SERPL-MCNC: 17 MG/DL (ref 8–22)
CALCIUM ALBUM COR SERPL-MCNC: 8.9 MG/DL (ref 8.5–10.5)
CALCIUM SERPL-MCNC: 8.8 MG/DL (ref 8.5–10.5)
CHLORIDE SERPL-SCNC: 104 MMOL/L (ref 96–112)
CHOLEST SERPL-MCNC: 154 MG/DL (ref 100–199)
CO2 SERPL-SCNC: 26 MMOL/L (ref 20–33)
CREAT SERPL-MCNC: 1.01 MG/DL (ref 0.5–1.4)
EOSINOPHIL # BLD AUTO: 0.12 K/UL (ref 0–0.51)
EOSINOPHIL NFR BLD: 1.9 % (ref 0–6.9)
ERYTHROCYTE [DISTWIDTH] IN BLOOD BY AUTOMATED COUNT: 46.6 FL (ref 35.9–50)
ESTRADIOL SERPL-MCNC: 88.8 PG/ML
FASTING STATUS PATIENT QL REPORTED: NORMAL
GFR SERPLBLD CREATININE-BSD FMLA CKD-EPI: 85 ML/MIN/1.73 M 2
GLOBULIN SER CALC-MCNC: 2.5 G/DL (ref 1.9–3.5)
GLUCOSE SERPL-MCNC: 95 MG/DL (ref 65–99)
HCT VFR BLD AUTO: 51.5 % (ref 42–52)
HDLC SERPL-MCNC: 36 MG/DL
HGB BLD-MCNC: 18.5 G/DL (ref 14–18)
IMM GRANULOCYTES # BLD AUTO: 0.02 K/UL (ref 0–0.11)
IMM GRANULOCYTES NFR BLD AUTO: 0.3 % (ref 0–0.9)
LDLC SERPL CALC-MCNC: 87 MG/DL
LYMPHOCYTES # BLD AUTO: 1.65 K/UL (ref 1–4.8)
LYMPHOCYTES NFR BLD: 26.4 % (ref 22–41)
MCH RBC QN AUTO: 33.4 PG (ref 27–33)
MCHC RBC AUTO-ENTMCNC: 35.9 G/DL (ref 32.3–36.5)
MCV RBC AUTO: 93 FL (ref 81.4–97.8)
MONOCYTES # BLD AUTO: 0.55 K/UL (ref 0–0.85)
MONOCYTES NFR BLD AUTO: 8.8 % (ref 0–13.4)
NEUTROPHILS # BLD AUTO: 3.9 K/UL (ref 1.82–7.42)
NEUTROPHILS NFR BLD: 62.3 % (ref 44–72)
NRBC # BLD AUTO: 0 K/UL
NRBC BLD-RTO: 0 /100 WBC (ref 0–0.2)
PLATELET # BLD AUTO: 270 K/UL (ref 164–446)
PMV BLD AUTO: 9.6 FL (ref 9–12.9)
POTASSIUM SERPL-SCNC: 4.4 MMOL/L (ref 3.6–5.5)
PROT SERPL-MCNC: 6.4 G/DL (ref 6–8.2)
RBC # BLD AUTO: 5.54 M/UL (ref 4.7–6.1)
SODIUM SERPL-SCNC: 140 MMOL/L (ref 135–145)
T4 FREE SERPL-MCNC: 1.03 NG/DL (ref 0.93–1.7)
TRIGL SERPL-MCNC: 154 MG/DL (ref 0–149)
TSH SERPL DL<=0.005 MIU/L-ACNC: 1.45 UIU/ML (ref 0.38–5.33)
WBC # BLD AUTO: 6.3 K/UL (ref 4.8–10.8)

## 2024-03-01 PROCEDURE — 80061 LIPID PANEL: CPT

## 2024-03-01 PROCEDURE — 84403 ASSAY OF TOTAL TESTOSTERONE: CPT

## 2024-03-01 PROCEDURE — 84270 ASSAY OF SEX HORMONE GLOBUL: CPT

## 2024-03-01 PROCEDURE — 85025 COMPLETE CBC W/AUTO DIFF WBC: CPT

## 2024-03-01 PROCEDURE — 84443 ASSAY THYROID STIM HORMONE: CPT

## 2024-03-01 PROCEDURE — 36415 COLL VENOUS BLD VENIPUNCTURE: CPT

## 2024-03-01 PROCEDURE — 82306 VITAMIN D 25 HYDROXY: CPT

## 2024-03-01 PROCEDURE — 84402 ASSAY OF FREE TESTOSTERONE: CPT

## 2024-03-01 PROCEDURE — 84439 ASSAY OF FREE THYROXINE: CPT

## 2024-03-01 PROCEDURE — 80053 COMPREHEN METABOLIC PANEL: CPT

## 2024-03-01 PROCEDURE — 82670 ASSAY OF TOTAL ESTRADIOL: CPT

## 2024-03-04 LAB
SHBG SERPL-SCNC: 30 NMOL/L (ref 19–76)
TESTOST FREE MFR SERPL: ABNORMAL % (ref 1.6–2.9)
TESTOST FREE SERPL-MCNC: ABNORMAL PG/ML (ref 47–244)
TESTOST SERPL-MCNC: >1500 NG/DL (ref 300–720)

## (undated) DEVICE — SET EXTENSION WITH 2 PORTS (48EA/CA) ***PART #2C8610 IS A SUBSTITUTE*****

## (undated) DEVICE — DRESSING PETROLEUM GAUZE 5 X 9" (50EA/BX 4BX/CA)"

## (undated) DEVICE — TELFA 8 X 10 BIOSEAL - (50EA/CA)

## (undated) DEVICE — TOWEL STOP TIMEOUT SAFETY FLAG (40EA/CA)

## (undated) DEVICE — GLOVE BIOGEL INDICATOR SZ 7SURGICAL PF LTX - (50/BX 4BX/CA)

## (undated) DEVICE — SODIUM CHL IRRIGATION 0.9% 1000ML (12EA/CA)

## (undated) DEVICE — PACK MINOR BASIN - (2EA/CA)

## (undated) DEVICE — PROTECTOR ULNA NERVE - (36PR/CA)

## (undated) DEVICE — SUTURE 3-0 VICRYL PLUS SH - 8X 18 INCH (12/BX)

## (undated) DEVICE — BLADE SURGICAL #10 - (50/BX)

## (undated) DEVICE — LACTATED RINGERS INJ 1000 ML - (14EA/CA 60CA/PF)

## (undated) DEVICE — GAUZE PACKING STRIP PLAIN STERILE - 1 IN X 5 YD (12/CA)

## (undated) DEVICE — DECANTER FLD BLS - (50/CA)

## (undated) DEVICE — SUCTION INSTRUMENT YANKAUER BULBOUS TIP W/O VENT (50EA/CA)

## (undated) DEVICE — GOWN WARMING STANDARD FLEX - (30/CA)

## (undated) DEVICE — STOCKINETTE, TUBULAR 6

## (undated) DEVICE — PAD LAP STERILE 18 X 18 - (5/PK 40PK/CA)

## (undated) DEVICE — GLOVE BIOGEL SZ 8 SURGICAL PF LTX - (50PR/BX 4BX/CA)

## (undated) DEVICE — DERMACARRIER 8 (NEW MESHGFT) - (10/BX)

## (undated) DEVICE — SENSOR OXIMETER ADULT SPO2 RD SET (20EA/BX)

## (undated) DEVICE — SUTURE GENERAL

## (undated) DEVICE — BANDAGE ELASTIC 6 IN X 5 YDS - LATEX FREE (10/BX)

## (undated) DEVICE — TRAY SKIN SCRUB PVP WET (20EA/CA) PART #DYND70356 DISCONTINUED

## (undated) DEVICE — KIT ANESTHESIA W/CIRCUIT & 3/LT BAG W/FILTER (20EA/CA)

## (undated) DEVICE — ELECTRODE 850 FOAM ADHESIVE - HYDROGEL RADIOTRNSPRNT (50/PK)

## (undated) DEVICE — SPONGE GAUZESTER 4 X 4 4PLY - (128PK/CA)

## (undated) DEVICE — CANISTER SUCTION 3000ML MECHANICAL FILTER AUTO SHUTOFF MEDI-VAC NONSTERILE LF DISP  (40EA/CA)

## (undated) DEVICE — TUBING CLEARLINK DUO-VENT - C-FLO (48EA/CA)

## (undated) DEVICE — HEAD HOLDER JUNIOR/ADULT

## (undated) DEVICE — ELECTRODE DUAL RETURN W/ CORD - (50/PK)

## (undated) DEVICE — DRESSING 3X8 ADAPTIC GAUZE - NON-ADHERING (36/PK 6PK/BX)

## (undated) DEVICE — DRAPE LAPAROTOMY T SHEET - (12EA/CA)

## (undated) DEVICE — BLADE DERMATOME NEW AIR (10/BX)

## (undated) DEVICE — BAG SPONGE COUNT 10.25 X 32 - BLUE (250/CA)

## (undated) DEVICE — SUTURE 2-0 ETHILON FS - (36/BX) 18 INCH

## (undated) DEVICE — SLEEVE, VASO, THIGH, MED

## (undated) DEVICE — GLOVE BIOGEL SZ 7 SURGICAL PF LTX - (50PR/BX 4BX/CA)

## (undated) DEVICE — SYRINGE 30 ML LL (56/BX)

## (undated) DEVICE — DRAPE SURGICAL U 77X120 - (10/CA)

## (undated) DEVICE — SUTURE 4-0 MONOCRYL PLUS PS-2 - 27 INCH (36/BX)

## (undated) DEVICE — SET LEADWIRE 5 LEAD BEDSIDE DISPOSABLE ECG (1SET OF 5/EA)

## (undated) DEVICE — MASK ANESTHESIA ADULT  - (100/CA)